# Patient Record
Sex: FEMALE | Race: WHITE | NOT HISPANIC OR LATINO | Employment: OTHER | URBAN - METROPOLITAN AREA
[De-identification: names, ages, dates, MRNs, and addresses within clinical notes are randomized per-mention and may not be internally consistent; named-entity substitution may affect disease eponyms.]

---

## 2023-04-10 LAB
ANION GAP IN SER/PLAS: 11 MMOL/L (ref 10–20)
BASOPHILS (10*3/UL) IN BLOOD BY AUTOMATED COUNT: 0.07 X10E9/L (ref 0–0.1)
BASOPHILS/100 LEUKOCYTES IN BLOOD BY AUTOMATED COUNT: 1.2 % (ref 0–2)
CALCIUM (MG/DL) IN SER/PLAS: 9.8 MG/DL (ref 8.6–10.3)
CARBON DIOXIDE, TOTAL (MMOL/L) IN SER/PLAS: 31 MMOL/L (ref 21–32)
CHLORIDE (MMOL/L) IN SER/PLAS: 101 MMOL/L (ref 98–107)
CREATININE (MG/DL) IN SER/PLAS: 0.56 MG/DL (ref 0.5–1.05)
EOSINOPHILS (10*3/UL) IN BLOOD BY AUTOMATED COUNT: 0.06 X10E9/L (ref 0–0.4)
EOSINOPHILS/100 LEUKOCYTES IN BLOOD BY AUTOMATED COUNT: 1 % (ref 0–6)
ERYTHROCYTE DISTRIBUTION WIDTH (RATIO) BY AUTOMATED COUNT: 13.1 % (ref 11.5–14.5)
ERYTHROCYTE MEAN CORPUSCULAR HEMOGLOBIN CONCENTRATION (G/DL) BY AUTOMATED: 32.4 G/DL (ref 32–36)
ERYTHROCYTE MEAN CORPUSCULAR VOLUME (FL) BY AUTOMATED COUNT: 94 FL (ref 80–100)
ERYTHROCYTES (10*6/UL) IN BLOOD BY AUTOMATED COUNT: 4.72 X10E12/L (ref 4–5.2)
GFR FEMALE: >90 ML/MIN/1.73M2
GLUCOSE (MG/DL) IN SER/PLAS: 101 MG/DL (ref 74–99)
HEMATOCRIT (%) IN BLOOD BY AUTOMATED COUNT: 44.4 % (ref 36–46)
HEMOGLOBIN (G/DL) IN BLOOD: 14.4 G/DL (ref 12–16)
IMMATURE GRANULOCYTES/100 LEUKOCYTES IN BLOOD BY AUTOMATED COUNT: 0.2 % (ref 0–0.9)
LEUKOCYTES (10*3/UL) IN BLOOD BY AUTOMATED COUNT: 6.1 X10E9/L (ref 4.4–11.3)
LYMPHOCYTES (10*3/UL) IN BLOOD BY AUTOMATED COUNT: 1.63 X10E9/L (ref 0.8–3)
LYMPHOCYTES/100 LEUKOCYTES IN BLOOD BY AUTOMATED COUNT: 26.9 % (ref 13–44)
MONOCYTES (10*3/UL) IN BLOOD BY AUTOMATED COUNT: 0.49 X10E9/L (ref 0.05–0.8)
MONOCYTES/100 LEUKOCYTES IN BLOOD BY AUTOMATED COUNT: 8.1 % (ref 2–10)
NEUTROPHILS (10*3/UL) IN BLOOD BY AUTOMATED COUNT: 3.79 X10E9/L (ref 1.6–5.5)
NEUTROPHILS/100 LEUKOCYTES IN BLOOD BY AUTOMATED COUNT: 62.6 % (ref 40–80)
PLATELETS (10*3/UL) IN BLOOD AUTOMATED COUNT: 233 X10E9/L (ref 150–450)
POTASSIUM (MMOL/L) IN SER/PLAS: 4.6 MMOL/L (ref 3.5–5.3)
SODIUM (MMOL/L) IN SER/PLAS: 138 MMOL/L (ref 136–145)
UREA NITROGEN (MG/DL) IN SER/PLAS: 10 MG/DL (ref 6–23)

## 2023-04-20 ENCOUNTER — HOSPITAL ENCOUNTER (OUTPATIENT)
Dept: DATA CONVERSION | Facility: HOSPITAL | Age: 83
End: 2023-04-20
Attending: ORTHOPAEDIC SURGERY | Admitting: ORTHOPAEDIC SURGERY
Payer: MEDICARE

## 2023-04-20 DIAGNOSIS — T84.82XS FIBROSIS DUE TO INTERNAL ORTHOPEDIC PROSTHETIC DEVICES, IMPLANTS AND GRAFTS, SEQUELA: ICD-10-CM

## 2023-04-20 DIAGNOSIS — T84.82XA: ICD-10-CM

## 2023-04-20 DIAGNOSIS — Z88.0 ALLERGY STATUS TO PENICILLIN: ICD-10-CM

## 2023-04-20 DIAGNOSIS — Z79.82 LONG TERM (CURRENT) USE OF ASPIRIN: ICD-10-CM

## 2023-09-07 VITALS — BODY MASS INDEX: 27.64 KG/M2 | HEIGHT: 66 IN | WEIGHT: 171.96 LBS

## 2023-09-14 NOTE — H&P
History & Physical Reviewed:   I have reviewed the History and Physical dated:  10-Apr-2023   History and Physical reviewed and relevant findings noted. Patient examined to review pertinent physical  findings.: No significant changes   Home Medications Reviewed: no changes noted   Allergies Reviewed: no changes noted       ERAS (Enhanced Recovery After Surgery):  ·  ERAS Patient: no     Consent:   COVID-19 Consent:  ·  COVID-19 Risk Consent Surgeon has reviewed key risks related to the risk of georgette COVID-19 and if they contract COVID-19 what the risks are.       Electronic Signatures:  Sixto Boswell)  (Signed 19-Apr-2023 10:55)   Authored: History & Physical Reviewed, ERAS, Consent,  Note Completion      Last Updated: 19-Apr-2023 10:55 by Sixto Boswell)

## 2023-10-02 NOTE — OP NOTE
Post Operative Note:     Post-Procedure Diagnosis: Arthrofibrosis Left total  knee   Procedure: Left knee arthrotomy and debridement and  manipulation   Surgeon: Elbert   Resident/Fellow/Other Assistant: Radha   Anesthesia: ga and regional   Estimated Blood Loss (mL): none   Specimen: no   Findings: see dx     Operative Report Dictated:  Dictation: not applicable - note contains Operative  Report   Operative Report:    Preop DX: Arthrofibrosis Left total knee  Postop DX: Same  Procedure: L knee arthrotomy with debridement and manipulation  Surgeon: Sixto Boswell MD  Asst: Agus Garcia  Anesthesia: ga and regional  Clinical Note: 82-year-old female status post primary left total knee at an outside hospital.  Present in the office last fall with persistent stiffness and inability to flex the knee past 90.  Has been to physical therapy.  She presents today for arthroscopic  possible open arthrotomy for debridement of arthrofibrosis.  Implants appeared stable by x-ray.  She understood the risk of surgery bleeding, infection, possibly further surgery or bracing.  Answered these risks wished proceed.  Procedure Note: Patient brought to operating room.  Timeout performed.  Antibiotics given IV.  General anesthesia given.  First attempted knee arthroscopy.  Leg was exsanguinated and thigh tourniquet was inflated to 325 mmHg.  No leg douglas was used.   Medial and lateral portals were made.  Palpation felt thickened scar in the anterior prepatellar region.  Shaver was placed in the joint debride the scar until we visualized the femoral component as well as the tibial poly.  After extensive shaving there  was still adherence and poor mobility of the patella with elected to proceed with open arthrotomy.  Previous incision was used.  Incision made down through skin down the extensor retinaculum.  There is adherence of this skin down to the extensor mechanism.   This was freed up with blunt dissection.  Medial  parapatellar arthritis performed with very thickened scar.  Some Ethibond sutures were removed.  Combination of sharp and blunt dissection expose the joint.  There was adherence in the quad on the anterior  distal femur.  This was broken up using a Mathew elevator.  Scar tissue was also sharply excised off the anterior cortex.  Scar was excised just posterior to the patellar tendon which is in continuity.  Some bands of scar towards the lateral gutter were  also excised.  Once all the scar tissue was excised, the knee was flexed to about 125 degrees with a soft endpoint.  Good central tracking of the patella.  Knee was irrigated copiously.  Arthrotomy repaired with interrupted 1 Vicryl suture.  Wound was  closed in layers.  Soft bulky dressing applied.  Regional block was then given by the anesthesia staff in the room prior to taking to the PACU.        Electronic Signatures:  Sixto Boswell)  (Signed 20-Apr-2023 11:23)   Authored: Post Operative Note, Note Completion      Last Updated: 20-Apr-2023 11:23 by Sixto Boswell)

## 2023-10-03 ENCOUNTER — OFFICE VISIT (OUTPATIENT)
Dept: PRIMARY CARE | Facility: CLINIC | Age: 83
End: 2023-10-03
Payer: MEDICARE

## 2023-10-03 ENCOUNTER — LAB (OUTPATIENT)
Dept: LAB | Facility: LAB | Age: 83
End: 2023-10-03
Payer: MEDICARE

## 2023-10-03 VITALS
HEART RATE: 77 BPM | HEIGHT: 66 IN | OXYGEN SATURATION: 97 % | DIASTOLIC BLOOD PRESSURE: 73 MMHG | WEIGHT: 163 LBS | TEMPERATURE: 97 F | BODY MASS INDEX: 26.2 KG/M2 | SYSTOLIC BLOOD PRESSURE: 152 MMHG

## 2023-10-03 DIAGNOSIS — F41.1 GAD (GENERALIZED ANXIETY DISORDER): ICD-10-CM

## 2023-10-03 DIAGNOSIS — K21.9 GERD WITHOUT ESOPHAGITIS: ICD-10-CM

## 2023-10-03 DIAGNOSIS — E78.2 HYPERLIPEMIA, MIXED: ICD-10-CM

## 2023-10-03 DIAGNOSIS — Z12.11 SCREENING FOR MALIGNANT NEOPLASM OF COLON: ICD-10-CM

## 2023-10-03 DIAGNOSIS — Z13.820 SCREENING FOR OSTEOPOROSIS: ICD-10-CM

## 2023-10-03 DIAGNOSIS — Z12.31 ENCOUNTER FOR SCREENING MAMMOGRAM FOR MALIGNANT NEOPLASM OF BREAST: ICD-10-CM

## 2023-10-03 DIAGNOSIS — R73.9 HYPERGLYCEMIA: ICD-10-CM

## 2023-10-03 DIAGNOSIS — E03.9 ACQUIRED HYPOTHYROIDISM: ICD-10-CM

## 2023-10-03 DIAGNOSIS — I10 BENIGN ESSENTIAL HYPERTENSION: ICD-10-CM

## 2023-10-03 DIAGNOSIS — L29.9 PRURITUS: ICD-10-CM

## 2023-10-03 DIAGNOSIS — J44.9 COPD MIXED TYPE (MULTI): Primary | ICD-10-CM

## 2023-10-03 DIAGNOSIS — J44.9 COPD MIXED TYPE (MULTI): ICD-10-CM

## 2023-10-03 PROBLEM — Z00.00 MEDICARE ANNUAL WELLNESS VISIT, SUBSEQUENT: Status: ACTIVE | Noted: 2023-10-03

## 2023-10-03 LAB
ALBUMIN SERPL BCP-MCNC: 4.5 G/DL (ref 3.4–5)
ALP SERPL-CCNC: 72 U/L (ref 33–136)
ALT SERPL W P-5'-P-CCNC: 19 U/L (ref 7–45)
ANION GAP SERPL CALC-SCNC: 15 MMOL/L (ref 10–20)
AST SERPL W P-5'-P-CCNC: 19 U/L (ref 9–39)
BASOPHILS # BLD AUTO: 0.05 X10*3/UL (ref 0–0.1)
BASOPHILS NFR BLD AUTO: 0.8 %
BILIRUB SERPL-MCNC: 0.5 MG/DL (ref 0–1.2)
BUN SERPL-MCNC: 11 MG/DL (ref 6–23)
CALCIUM SERPL-MCNC: 9.9 MG/DL (ref 8.6–10.6)
CHLORIDE SERPL-SCNC: 102 MMOL/L (ref 98–107)
CHOLEST SERPL-MCNC: 152 MG/DL (ref 0–199)
CHOLESTEROL/HDL RATIO: 2.3
CO2 SERPL-SCNC: 29 MMOL/L (ref 21–32)
CREAT SERPL-MCNC: 0.5 MG/DL (ref 0.5–1.05)
EOSINOPHIL # BLD AUTO: 0.05 X10*3/UL (ref 0–0.4)
EOSINOPHIL NFR BLD AUTO: 0.8 %
ERYTHROCYTE [DISTWIDTH] IN BLOOD BY AUTOMATED COUNT: 15 % (ref 11.5–14.5)
GFR SERPL CREATININE-BSD FRML MDRD: >90 ML/MIN/1.73M*2
GLUCOSE SERPL-MCNC: 127 MG/DL (ref 74–99)
HCT VFR BLD AUTO: 44.1 % (ref 36–46)
HDLC SERPL-MCNC: 66.9 MG/DL
HGB BLD-MCNC: 13.6 G/DL (ref 12–16)
IMM GRANULOCYTES # BLD AUTO: 0.01 X10*3/UL (ref 0–0.5)
IMM GRANULOCYTES NFR BLD AUTO: 0.2 % (ref 0–0.9)
LDLC SERPL CALC-MCNC: 66 MG/DL (ref 140–190)
LYMPHOCYTES # BLD AUTO: 1.41 X10*3/UL (ref 0.8–3)
LYMPHOCYTES NFR BLD AUTO: 21.8 %
MAGNESIUM SERPL-MCNC: 2.15 MG/DL (ref 1.6–2.4)
MCH RBC QN AUTO: 30.8 PG (ref 26–34)
MCHC RBC AUTO-ENTMCNC: 30.8 G/DL (ref 32–36)
MCV RBC AUTO: 100 FL (ref 80–100)
MONOCYTES # BLD AUTO: 0.42 X10*3/UL (ref 0.05–0.8)
MONOCYTES NFR BLD AUTO: 6.5 %
NEUTROPHILS # BLD AUTO: 4.52 X10*3/UL (ref 1.6–5.5)
NEUTROPHILS NFR BLD AUTO: 69.9 %
NON HDL CHOLESTEROL: 85 MG/DL (ref 0–149)
NRBC BLD-RTO: 0 /100 WBCS (ref 0–0)
PLATELET # BLD AUTO: 273 X10*3/UL (ref 150–450)
PMV BLD AUTO: 9.7 FL (ref 7.5–11.5)
POTASSIUM SERPL-SCNC: 4.2 MMOL/L (ref 3.5–5.3)
PROT SERPL-MCNC: 6.7 G/DL (ref 6.4–8.2)
RBC # BLD AUTO: 4.41 X10*6/UL (ref 4–5.2)
SODIUM SERPL-SCNC: 142 MMOL/L (ref 136–145)
TRIGL SERPL-MCNC: 94 MG/DL (ref 0–149)
TSH SERPL-ACNC: 1.55 MIU/L (ref 0.44–3.98)
URATE SERPL-MCNC: 2.5 MG/DL (ref 2.3–6.7)
VLDL: 19 MG/DL (ref 0–40)
WBC # BLD AUTO: 6.5 X10*3/UL (ref 4.4–11.3)

## 2023-10-03 PROCEDURE — 3077F SYST BP >= 140 MM HG: CPT | Performed by: INTERNAL MEDICINE

## 2023-10-03 PROCEDURE — 96372 THER/PROPH/DIAG INJ SC/IM: CPT | Performed by: INTERNAL MEDICINE

## 2023-10-03 PROCEDURE — 1159F MED LIST DOCD IN RCRD: CPT | Performed by: INTERNAL MEDICINE

## 2023-10-03 PROCEDURE — 99203 OFFICE O/P NEW LOW 30 MIN: CPT | Performed by: INTERNAL MEDICINE

## 2023-10-03 PROCEDURE — 1036F TOBACCO NON-USER: CPT | Performed by: INTERNAL MEDICINE

## 2023-10-03 PROCEDURE — 3078F DIAST BP <80 MM HG: CPT | Performed by: INTERNAL MEDICINE

## 2023-10-03 PROCEDURE — 36415 COLL VENOUS BLD VENIPUNCTURE: CPT

## 2023-10-03 PROCEDURE — 1125F AMNT PAIN NOTED PAIN PRSNT: CPT | Performed by: INTERNAL MEDICINE

## 2023-10-03 PROCEDURE — 1160F RVW MEDS BY RX/DR IN RCRD: CPT | Performed by: INTERNAL MEDICINE

## 2023-10-03 RX ORDER — TRIAMCINOLONE ACETONIDE 40 MG/ML
40 INJECTION, SUSPENSION INTRA-ARTICULAR; INTRAMUSCULAR ONCE
Status: COMPLETED | OUTPATIENT
Start: 2023-10-03 | End: 2023-10-03

## 2023-10-03 RX ORDER — AMITRIPTYLINE HYDROCHLORIDE 10 MG/1
10 TABLET, FILM COATED ORAL NIGHTLY
Qty: 30 TABLET | Refills: 0 | Status: SHIPPED | OUTPATIENT
Start: 2023-10-03 | End: 2023-12-05 | Stop reason: ALTCHOICE

## 2023-10-03 RX ORDER — VALACYCLOVIR HYDROCHLORIDE 500 MG/1
500 TABLET, FILM COATED ORAL DAILY
COMMUNITY

## 2023-10-03 RX ORDER — LOSARTAN POTASSIUM 50 MG/1
50 TABLET ORAL 2 TIMES DAILY
COMMUNITY
Start: 2023-07-12 | End: 2024-01-18 | Stop reason: SDUPTHER

## 2023-10-03 RX ORDER — FAMOTIDINE 40 MG/1
40 TABLET, FILM COATED ORAL 2 TIMES DAILY
COMMUNITY
End: 2023-12-05 | Stop reason: ALTCHOICE

## 2023-10-03 RX ORDER — MULTIVITAMIN
1 TABLET ORAL DAILY
COMMUNITY

## 2023-10-03 RX ORDER — HYDROXYZINE HYDROCHLORIDE 25 MG/1
25 TABLET, FILM COATED ORAL DAILY
Qty: 30 TABLET | Refills: 0 | Status: SHIPPED | OUTPATIENT
Start: 2023-10-03 | End: 2023-12-05 | Stop reason: ALTCHOICE

## 2023-10-03 RX ORDER — ATORVASTATIN CALCIUM 10 MG/1
10 TABLET, FILM COATED ORAL DAILY
COMMUNITY
End: 2024-02-27 | Stop reason: SDUPTHER

## 2023-10-03 RX ORDER — BUDESONIDE AND FORMOTEROL FUMARATE DIHYDRATE 80; 4.5 UG/1; UG/1
AEROSOL RESPIRATORY (INHALATION)
COMMUNITY
Start: 2023-09-29 | End: 2023-12-05 | Stop reason: SDUPTHER

## 2023-10-03 RX ADMIN — TRIAMCINOLONE ACETONIDE 40 MG: 40 INJECTION, SUSPENSION INTRA-ARTICULAR; INTRAMUSCULAR at 16:49

## 2023-10-03 NOTE — PROGRESS NOTES
Subjective   Patient ID: Rita Vazquez is a 83 y.o. female who presents for New Patient Visit (Itching and tingling all over on and off /Needs a follow up chest xray for pneumonia/Under a lot of stress/).    Assessment/Plan     Problem List Items Addressed This Visit       Medicare annual wellness visit, subsequent - Primary    Acquired hypothyroidism    Relevant Orders    Albumin , Urine Random    CBC and Auto Differential    Comprehensive Metabolic Panel    Hemoglobin A1C    Lipid Panel    Magnesium    Urinalysis Microscopic Only    Uric Acid    TSH with reflex to Free T4 if abnormal    GERD without esophagitis    Relevant Orders    Albumin , Urine Random    CBC and Auto Differential    Comprehensive Metabolic Panel    Hemoglobin A1C    Lipid Panel    Magnesium    Urinalysis Microscopic Only    Uric Acid    TSH with reflex to Free T4 if abnormal    Hyperlipemia, mixed    Relevant Orders    Albumin , Urine Random    CBC and Auto Differential    Comprehensive Metabolic Panel    Hemoglobin A1C    Lipid Panel    Magnesium    Urinalysis Microscopic Only    Uric Acid    TSH with reflex to Free T4 if abnormal    COPD mixed type (CMS/HCC)     Pt has moderate to severe COPD. It is progressive disease, use of MDI and proper technique discussed, rinse mouth after inhaled corticosteroids. Notify if progressive dyspnea or cough or lethargy, monitor oxygen saturation if possible with home based pulse oximetry. Avoid hospitalization and call us if any flare ups are about to happen, be sure that annual flu vaccine are uptodate and review need for pneumococcal vaccine. Light to moderate low impact exercises are very helpful and deep breathing  exercises are beneficial in chronic lung diseases.          Relevant Orders    Albumin , Urine Random    CBC and Auto Differential    Comprehensive Metabolic Panel    Hemoglobin A1C    Lipid Panel    Magnesium    Urinalysis Microscopic Only    Uric Acid    TSH with reflex to Free T4 if  abnormal    Hyperglycemia    Relevant Orders    Hemoglobin A1C    Benign essential hypertension     Patients BP readings reviewed and addressed, as we age our arteries turn stiffer and less elastic. Restricting salt consumption and staying physically fit with regular exercise regimen is the only way to keep our vasculature less tonic. Studies have shown that keeping ideal body wt, exercise routine about 140 to 150 minutes a week, eating variety of plant based diet and drinking plentiful water are quite helpful. Monitor BP twice or once a week at home and bring log to be reviewed by me. Uncontrolled BP has long term consequences including heart failure, myocardial infarction, accelerated atherosclerosis and kidney dysfunction. Therapy reviewed and explained.              HPI 83-year-old patient who is a  have a 1 child    1 brother 1 sister    Mother have Parkinson    Father had prostate cancer    Personal history of hypertension hyperlipidemia pneumonia COPD pruritus seen by PCP pulmonary dermatology    Complaining of the tingling numbness in the hands arms and legs onset gradually duration few months progressed slowly    Also complained of moderate pruritus insomnia    Cough congestion shortness of breath    Negative for hematuria Apetex's hemoptysis    Negative for weight loss    Negative for COVID    Negative for fall    Negative for suicide      Past Medical History:   Diagnosis Date    Personal history of other diseases of the musculoskeletal system and connective tissue 09/28/2022    History of arthritis    Personal history of other diseases of the respiratory system 09/28/2022    History of sinus problem    Personal history of other infectious and parasitic diseases     History of herpes genitalis    Urinary tract infection, site not specified 11/21/2021    Acute UTI     Past Surgical History:   Procedure Laterality Date    KNEE      OTHER SURGICAL HISTORY  09/28/2022    Knee replacement    OTHER  "SURGICAL HISTORY  09/28/2022    Back surgery     Allergies   Allergen Reactions    Amoxicillin Other    Metronidazole Other     Current Outpatient Medications   Medication Sig Dispense Refill    atorvastatin (Lipitor) 10 mg tablet Take 1 tablet (10 mg) by mouth once daily.      famotidine (Pepcid) 40 mg tablet Take 1 tablet (40 mg) by mouth 2 times a day.      losartan (Cozaar) 50 mg tablet Take 1 tablet (50 mg) by mouth 2 times a day.      Symbicort 80-4.5 mcg/actuation inhaler        No current facility-administered medications for this visit.     Family History   Problem Relation Name Age of Onset    Parkinsonism Mother      Prostate cancer Father       Social History     Socioeconomic History    Marital status:      Spouse name: None    Number of children: None    Years of education: None    Highest education level: None   Occupational History    None   Tobacco Use    Smoking status: Former     Packs/day: 0.50     Years: 30.00     Additional pack years: 0.00     Total pack years: 15.00     Types: Cigarettes    Smokeless tobacco: Never   Substance and Sexual Activity    Alcohol use: Never    Drug use: Never    Sexual activity: None   Other Topics Concern    None   Social History Narrative    None     Social Determinants of Health     Financial Resource Strain: Not on file   Food Insecurity: Not on file   Transportation Needs: Not on file   Physical Activity: Not on file   Stress: Not on file   Social Connections: Not on file   Intimate Partner Violence: Not on file   Housing Stability: Not on file       There is no immunization history on file for this patient.    Review of Systems  Review of systems is otherwise negative unless stated above or in history of present illness.    Objective   Visit Vitals  /73 (BP Location: Left arm, Patient Position: Sitting, BP Cuff Size: Adult)   Pulse 77   Temp 36.1 °C (97 °F)   Ht 1.676 m (5' 6\")   Wt 73.9 kg (163 lb)   SpO2 97%   BMI 26.31 kg/m²   Smoking Status " Former   BSA 1.85 m²     Physical Exam  Constitutional: Anxiety     General: not in acute distress.   HENT:      Head: Normocephalic and atraumatic.      Nose: Nose normal.   Eyes: Dry eye     Extraocular Movements: Extraocular movements intact.      Conjunctiva/sclera: Conjunctivae normal.   Cardiovascular: Heart murmur     Rate and Rhythm: Normal rate ,  No M/R/G  Pulmonary: Barrel chest crackles rales rhonchi     Effort: Pulmonary effort is normal.      Breath sounds: Normal, Bilat Equal AE  Skin: Pruritus dry skin     General: Skin is warm.   Neurological: Myalgia neuralgia     Mental Status: He is alert and oriented to person, place, and time.   Psychiatric:    Anxiety depression     Mood and Affect: Mood normal.         Behavior: Behavior normal.   Musculoskeletal osteopenia osteoarthritis  FROM in all extremitirs,  Joint-no swelling or tenderness    No visits with results within 4 Month(s) from this visit.   Latest known visit with results is:   Orders Only on 04/10/2023   Component Date Value Ref Range Status    WBC 04/10/2023 6.1  4.4 - 11.3 x10E9/L Final    RBC 04/10/2023 4.72  4.00 - 5.20 x10E12/L Final    Hemoglobin 04/10/2023 14.4  12.0 - 16.0 g/dL Final    Hematocrit 04/10/2023 44.4  36.0 - 46.0 % Final    MCV 04/10/2023 94  80 - 100 fL Final    MCHC 04/10/2023 32.4  32.0 - 36.0 g/dL Final    Platelets 04/10/2023 233  150 - 450 x10E9/L Final    RDW 04/10/2023 13.1  11.5 - 14.5 % Final    Neutrophils % 04/10/2023 62.6  40.0 - 80.0 % Final    Immature Granulocytes %, Automated 04/10/2023 0.2  0.0 - 0.9 % Final    Lymphocytes % 04/10/2023 26.9  13.0 - 44.0 % Final    Monocytes % 04/10/2023 8.1  2.0 - 10.0 % Final    Eosinophils % 04/10/2023 1.0  0.0 - 6.0 % Final    Basophils % 04/10/2023 1.2  0.0 - 2.0 % Final    Neutrophils Absolute 04/10/2023 3.79  1.60 - 5.50 x10E9/L Final    Lymphocytes Absolute 04/10/2023 1.63  0.80 - 3.00 x10E9/L Final    Monocytes Absolute 04/10/2023 0.49  0.05 - 0.80 x10E9/L  Final    Eosinophils Absolute 04/10/2023 0.06  0.00 - 0.40 x10E9/L Final    Basophils Absolute 04/10/2023 0.07  0.00 - 0.10 x10E9/L Final       Radiology: Reviewed imaging in powerchart.  No results found.      Charting was completed using voice recognition technology and may include unintended errors.

## 2023-10-04 LAB
EST. AVERAGE GLUCOSE BLD GHB EST-MCNC: 126 MG/DL
HBA1C MFR BLD: 6 %

## 2023-10-05 ENCOUNTER — TELEPHONE (OUTPATIENT)
Dept: PRIMARY CARE | Facility: CLINIC | Age: 83
End: 2023-10-05

## 2023-10-05 NOTE — TELEPHONE ENCOUNTER
"Tried calling pt but no answer. LVM for pt to return call, will try to call again later.     Per Dr. Kellogg \" Mild anemia hemoglobin A1c 6 blood sugar 127  Slow Fe 1 a day low-carb diet metformin 250 mg a day #90 follow-up 3 months repeat CBC BMP 3 months \"  "

## 2023-10-18 ENCOUNTER — TELEPHONE (OUTPATIENT)
Dept: PRIMARY CARE | Facility: CLINIC | Age: 83
End: 2023-10-18

## 2023-10-19 ENCOUNTER — TELEPHONE (OUTPATIENT)
Dept: PRIMARY CARE | Facility: CLINIC | Age: 83
End: 2023-10-19

## 2023-11-24 LAB — NONINV COLON CA DNA+OCC BLD SCRN STL QL: POSITIVE

## 2023-11-27 ENCOUNTER — TELEPHONE (OUTPATIENT)
Dept: PRIMARY CARE | Facility: CLINIC | Age: 83
End: 2023-11-27

## 2023-11-27 NOTE — TELEPHONE ENCOUNTER
----- Message from Js Kellogg MD sent at 11/27/2023  8:45 AM EST -----  Positive Cologuard advised to see Dr. Steinberg for colonoscopy or Dr. Villagran

## 2023-12-05 ENCOUNTER — OFFICE VISIT (OUTPATIENT)
Dept: PRIMARY CARE | Facility: CLINIC | Age: 83
End: 2023-12-05
Payer: MEDICARE

## 2023-12-05 VITALS
DIASTOLIC BLOOD PRESSURE: 68 MMHG | HEIGHT: 66 IN | SYSTOLIC BLOOD PRESSURE: 131 MMHG | TEMPERATURE: 96.3 F | HEART RATE: 65 BPM | OXYGEN SATURATION: 96 % | BODY MASS INDEX: 25.75 KG/M2 | WEIGHT: 160.2 LBS

## 2023-12-05 DIAGNOSIS — E78.2 HYPERLIPEMIA, MIXED: ICD-10-CM

## 2023-12-05 DIAGNOSIS — J44.9 COPD MIXED TYPE (MULTI): Primary | ICD-10-CM

## 2023-12-05 DIAGNOSIS — L29.9 PRURITUS: ICD-10-CM

## 2023-12-05 DIAGNOSIS — I10 BENIGN ESSENTIAL HYPERTENSION: ICD-10-CM

## 2023-12-05 PROBLEM — R73.9 HYPERGLYCEMIA: Status: RESOLVED | Noted: 2023-10-03 | Resolved: 2023-12-05

## 2023-12-05 PROBLEM — Z00.00 MEDICARE ANNUAL WELLNESS VISIT, SUBSEQUENT: Status: RESOLVED | Noted: 2023-10-03 | Resolved: 2023-12-05

## 2023-12-05 PROBLEM — K21.9 GERD WITHOUT ESOPHAGITIS: Status: RESOLVED | Noted: 2023-10-03 | Resolved: 2023-12-05

## 2023-12-05 PROBLEM — F41.1 GAD (GENERALIZED ANXIETY DISORDER): Status: RESOLVED | Noted: 2023-10-03 | Resolved: 2023-12-05

## 2023-12-05 PROBLEM — E03.9 ACQUIRED HYPOTHYROIDISM: Status: RESOLVED | Noted: 2023-10-03 | Resolved: 2023-12-05

## 2023-12-05 PROCEDURE — 96372 THER/PROPH/DIAG INJ SC/IM: CPT | Performed by: INTERNAL MEDICINE

## 2023-12-05 PROCEDURE — 99214 OFFICE O/P EST MOD 30 MIN: CPT | Performed by: INTERNAL MEDICINE

## 2023-12-05 PROCEDURE — 3075F SYST BP GE 130 - 139MM HG: CPT | Performed by: INTERNAL MEDICINE

## 2023-12-05 PROCEDURE — 1125F AMNT PAIN NOTED PAIN PRSNT: CPT | Performed by: INTERNAL MEDICINE

## 2023-12-05 PROCEDURE — 1160F RVW MEDS BY RX/DR IN RCRD: CPT | Performed by: INTERNAL MEDICINE

## 2023-12-05 PROCEDURE — 3078F DIAST BP <80 MM HG: CPT | Performed by: INTERNAL MEDICINE

## 2023-12-05 PROCEDURE — 1036F TOBACCO NON-USER: CPT | Performed by: INTERNAL MEDICINE

## 2023-12-05 PROCEDURE — 1159F MED LIST DOCD IN RCRD: CPT | Performed by: INTERNAL MEDICINE

## 2023-12-05 RX ORDER — TRIAMCINOLONE ACETONIDE 40 MG/ML
80 INJECTION, SUSPENSION INTRA-ARTICULAR; INTRAMUSCULAR ONCE
Status: COMPLETED | OUTPATIENT
Start: 2023-12-05 | End: 2023-12-05

## 2023-12-05 RX ORDER — BUDESONIDE AND FORMOTEROL FUMARATE DIHYDRATE 80; 4.5 UG/1; UG/1
AEROSOL RESPIRATORY (INHALATION)
Qty: 10.2 EACH | Refills: 3 | Status: SHIPPED | OUTPATIENT
Start: 2023-12-05

## 2023-12-05 RX ORDER — HYDROXYZINE HYDROCHLORIDE 25 MG/1
25 TABLET, FILM COATED ORAL DAILY
Qty: 90 TABLET | Refills: 1 | Status: SHIPPED | OUTPATIENT
Start: 2023-12-05 | End: 2024-02-23 | Stop reason: WASHOUT

## 2023-12-05 RX ADMIN — TRIAMCINOLONE ACETONIDE 80 MG: 40 INJECTION, SUSPENSION INTRA-ARTICULAR; INTRAMUSCULAR at 13:59

## 2023-12-05 NOTE — PROGRESS NOTES
Subjective   Patient ID: Rita Vazquez is a 83 y.o. female who presents for Follow-up (3 month /How long should she be on the iron/Question on the medication for itching ).    Assessment/Plan     Problem List Items Addressed This Visit       Hyperlipemia, mixed     Low-fat diet exercise Lipitor 10 mg a day LFT CPK lipid twice a year         COPD mixed type (CMS/HCC) - Primary     COPD pruritus itching allergy given Kenalog injection 80 mg intramuscular continue Symbicort and Atarax    Flu pneumonia COVID-19 vaccines    PFT once a year         Relevant Orders    CBC and Auto Differential    Basic metabolic panel    Benign essential hypertension     Blood pressure 131/68 losartan 50 mg a day check BMP twice a year         Relevant Orders    CBC and Auto Differential    Basic metabolic panel    Pruritus     Patient was evaluated today, problem list was reviewed, problems and concerns addressed, Rx list reviewed and updated, lab and tests were noted and reviewed. Life style changes were discussed, always it works better if we eat plant based diet and plenty of fibres and roughage. Consume adequate amount of water and avoid alcohol, light to moderate physical activities and stress reduction are always beneficial for ongoing physical well being. Do not forget to have 6 to 7 hours of sleep regularly and avoid late night julio cesar screen exposure.    HPI 83-year-old patient have metabolic syndrome hypertension hyperlipidemia chronic lung disease chronic pruritus osteopenia osteoarthritis iron deficiency anemia Cologuard positive going to see gastroenterology for colonoscopy    Complaining the cough congestions pruritus itching    Onset gradually duration few months progressed slowly aggravating factor cold weather autoimmune disease    Hyperlipidemia Lipitor    Anemia iron B12 folic acid    Hypertension Cozaar    COPD Symbicort Kenalog    Occult blood and Cologuard positive refer to GI for colonoscopy and further  Past Medical  History:   Diagnosis Date    Personal history of other diseases of the musculoskeletal system and connective tissue 09/28/2022    History of arthritis    Personal history of other diseases of the respiratory system 09/28/2022    History of sinus problem    Personal history of other infectious and parasitic diseases     History of herpes genitalis    Urinary tract infection, site not specified 11/21/2021    Acute UTI     Past Surgical History:   Procedure Laterality Date    KNEE      OTHER SURGICAL HISTORY  09/28/2022    Knee replacement    OTHER SURGICAL HISTORY  09/28/2022    Back surgery     Allergies   Allergen Reactions    Amoxicillin Other    Bisacodyl Unknown    Metronidazole Other     Current Outpatient Medications   Medication Sig Dispense Refill    atorvastatin (Lipitor) 10 mg tablet Take 1 tablet (10 mg) by mouth once daily.      calcium carbonate/vitamin D3 (CALTRATE WITH VITAMIN D3 ORAL) Take by mouth.      ferrous sulfate (SLOW FE ORAL) Take by mouth.      losartan (Cozaar) 50 mg tablet Take 1 tablet (50 mg) by mouth 2 times a day.      multivitamin tablet Take 1 tablet by mouth once daily.      Symbicort 80-4.5 mcg/actuation inhaler       valACYclovir (Valtrex) 500 mg tablet Take 1 tablet (500 mg) by mouth once daily.       No current facility-administered medications for this visit.     Family History   Problem Relation Name Age of Onset    Parkinsonism Mother      Prostate cancer Father       Social History     Socioeconomic History    Marital status:      Spouse name: None    Number of children: None    Years of education: None    Highest education level: None   Occupational History    None   Tobacco Use    Smoking status: Former     Packs/day: 0.50     Years: 30.00     Additional pack years: 0.00     Total pack years: 15.00     Types: Cigarettes    Smokeless tobacco: Never   Substance and Sexual Activity    Alcohol use: Never    Drug use: Never    Sexual activity: None   Other Topics  "Concern    None   Social History Narrative    None     Social Determinants of Health     Financial Resource Strain: Not on file   Food Insecurity: Not on file   Transportation Needs: Not on file   Physical Activity: Not on file   Stress: Not on file   Social Connections: Not on file   Intimate Partner Violence: Not on file   Housing Stability: Not on file     Immunization History   Administered Date(s) Administered    Flu vaccine, quadrivalent, high-dose, preservative free, age 65y+ (FLUZONE) 10/23/2023    Influenza, High Dose Seasonal, Preservative Free 10/25/2016, 10/11/2018    Influenza, Seasonal, Quadrivalent, Adjuvanted 10/01/2020, 10/06/2021, 10/07/2022    Influenza, injectable, MDCK, preservative free, quadrivalent 10/30/2017    Influenza, seasonal, injectable 11/09/2011    Influenza, seasonal, injectable, preservative free 10/12/2015    Moderna COVID-19 vaccine, Fall 2023, 12 yeasrs and older (50mcg/0.5mL) 10/09/2023    Moderna COVID-19 vaccine, bivalent, blue cap/gray label *Check age/dose* 11/21/2022    Moderna SARS-CoV-2 Vaccination 01/07/2021, 02/03/2021, 10/29/2021, 06/10/2022    Pneumococcal conjugate vaccine, 13-valent (PREVNAR 13) 10/12/2015    Pneumococcal conjugate vaccine, 20-valent (PREVNAR 20) 06/30/2023    Pneumococcal polysaccharide vaccine, 23-valent, age 2 years and older (PNEUMOVAX 23) 11/02/2020    Tdap vaccine, age 7 year and older (BOOSTRIX) 06/30/2023    Zoster vaccine, recombinant, adult (SHINGRIX) 09/24/2019, 09/27/2019, 12/03/2019    Zoster, live 01/10/2017       Review of Systems  Review of systems is otherwise negative unless stated above or in history of present illness.    Objective   Visit Vitals  /68 (BP Location: Left arm, Patient Position: Sitting, BP Cuff Size: Adult)   Pulse 65   Temp 35.7 °C (96.3 °F)   Ht 1.676 m (5' 6\")   Wt 72.7 kg (160 lb 3.2 oz)   SpO2 96%   BMI 25.86 kg/m²   Smoking Status Former   BSA 1.84 m²     Physical Exam  Constitutional: BMI 25     " General: not in acute distress.   HENT: Allergic right     Head: Normocephalic and atraumatic.      Nose: Nose normal.   Eyes:      Extraocular Movements: Extraocular movements intact.      Conjunctiva/sclera: Conjunctivae normal.   Cardiovascular: Systolic heart   rate and Rhythm: Normal rate ,  No M/R/G  Pulmonary: Expiratory rhonchi with crackles     Effort: Pulmonary effort is normal.      Breath sounds: Normal, Bilat Equal AE  Skin: Dry skin with pruritus     General: Skin is warm.   Neurological:      Mental Status: He is alert and oriented to person, place, and time.   Psychiatric:         Mood and Affect: Mood normal.         Behavior: Behavior normal.   Musculoskeletal   FROM in all extremitirs,  Joint-no swelling or tenderness  Glucose 127 low-carb diet hemoglobin A1c 6 dietitian evaluation Cologuard positive and GI evaluation  Lab on 10/03/2023   Component Date Value Ref Range Status    WBC 10/03/2023 6.5  4.4 - 11.3 x10*3/uL Final    nRBC 10/03/2023 0.0  0.0 - 0.0 /100 WBCs Final    RBC 10/03/2023 4.41  4.00 - 5.20 x10*6/uL Final    Hemoglobin 10/03/2023 13.6  12.0 - 16.0 g/dL Final    Hematocrit 10/03/2023 44.1  36.0 - 46.0 % Final    MCV 10/03/2023 100  80 - 100 fL Final    MCH 10/03/2023 30.8  26.0 - 34.0 pg Final    MCHC 10/03/2023 30.8 (L)  32.0 - 36.0 g/dL Final    RDW 10/03/2023 15.0 (H)  11.5 - 14.5 % Final    Platelets 10/03/2023 273  150 - 450 x10*3/uL Final    MPV 10/03/2023 9.7  7.5 - 11.5 fL Final    Neutrophils % 10/03/2023 69.9  40.0 - 80.0 % Final    Immature Granulocytes %, Automated 10/03/2023 0.2  0.0 - 0.9 % Final    Lymphocytes % 10/03/2023 21.8  13.0 - 44.0 % Final    Monocytes % 10/03/2023 6.5  2.0 - 10.0 % Final    Eosinophils % 10/03/2023 0.8  0.0 - 6.0 % Final    Basophils % 10/03/2023 0.8  0.0 - 2.0 % Final    Neutrophils Absolute 10/03/2023 4.52  1.60 - 5.50 x10*3/uL Final    Immature Granulocytes Absolute, Au* 10/03/2023 0.01  0.00 - 0.50 x10*3/uL Final    Lymphocytes  Absolute 10/03/2023 1.41  0.80 - 3.00 x10*3/uL Final    Monocytes Absolute 10/03/2023 0.42  0.05 - 0.80 x10*3/uL Final    Eosinophils Absolute 10/03/2023 0.05  0.00 - 0.40 x10*3/uL Final    Basophils Absolute 10/03/2023 0.05  0.00 - 0.10 x10*3/uL Final    Glucose 10/03/2023 127 (H)  74 - 99 mg/dL Final    Sodium 10/03/2023 142  136 - 145 mmol/L Final    Potassium 10/03/2023 4.2  3.5 - 5.3 mmol/L Final    Chloride 10/03/2023 102  98 - 107 mmol/L Final    Bicarbonate 10/03/2023 29  21 - 32 mmol/L Final    Anion Gap 10/03/2023 15  10 - 20 mmol/L Final    Urea Nitrogen 10/03/2023 11  6 - 23 mg/dL Final    Creatinine 10/03/2023 0.50  0.50 - 1.05 mg/dL Final    eGFR 10/03/2023 >90  >60 mL/min/1.73m*2 Final    Calcium 10/03/2023 9.9  8.6 - 10.6 mg/dL Final    Albumin 10/03/2023 4.5  3.4 - 5.0 g/dL Final    Alkaline Phosphatase 10/03/2023 72  33 - 136 U/L Final    Total Protein 10/03/2023 6.7  6.4 - 8.2 g/dL Final    AST 10/03/2023 19  9 - 39 U/L Final    Bilirubin, Total 10/03/2023 0.5  0.0 - 1.2 mg/dL Final    ALT 10/03/2023 19  7 - 45 U/L Final    Hemoglobin A1C 10/03/2023 6.0 (H)  see below % Final    Estimated Average Glucose 10/03/2023 126  Not Established mg/dL Final    Cholesterol 10/03/2023 152  0 - 199 mg/dL Final    HDL-Cholesterol 10/03/2023 66.9  mg/dL Final    Cholesterol/HDL Ratio 10/03/2023 2.3   Final    LDL Calculated 10/03/2023 66 (L)  140 - 190 mg/dL Final    VLDL 10/03/2023 19  0 - 40 mg/dL Final    Triglycerides 10/03/2023 94  0 - 149 mg/dL Final    Non HDL Cholesterol 10/03/2023 85  0 - 149 mg/dL Final    Magnesium 10/03/2023 2.15  1.60 - 2.40 mg/dL Final    Uric Acid 10/03/2023 2.5  2.3 - 6.7 mg/dL Final    Thyroid Stimulating Hormone 10/03/2023 1.55  0.44 - 3.98 mIU/L Final   Office Visit on 10/03/2023   Component Date Value Ref Range Status    NONINV COLON CA DNA+OCC BLD SCRN S* 11/15/2023 Positive (A)  Negative Final       Radiology: Reviewed imaging in powerchart.  No results found.      Charting  was completed using voice recognition technology and may include unintended errors.

## 2023-12-05 NOTE — ASSESSMENT & PLAN NOTE
COPD pruritus itching allergy given Kenalog injection 80 mg intramuscular continue Symbicort and Atarax    Flu pneumonia COVID-19 vaccines    PFT once a year

## 2023-12-27 ENCOUNTER — TELEMEDICINE (OUTPATIENT)
Dept: PRIMARY CARE | Facility: CLINIC | Age: 83
End: 2023-12-27
Payer: MEDICARE

## 2023-12-27 VITALS — HEIGHT: 66 IN | BODY MASS INDEX: 25.71 KG/M2 | WEIGHT: 160 LBS

## 2023-12-27 DIAGNOSIS — U07.1 COVID: Primary | ICD-10-CM

## 2023-12-27 PROCEDURE — 99213 OFFICE O/P EST LOW 20 MIN: CPT | Performed by: EMERGENCY MEDICINE

## 2023-12-27 RX ORDER — IPRATROPIUM BROMIDE AND ALBUTEROL SULFATE 2.5; .5 MG/3ML; MG/3ML
SOLUTION RESPIRATORY (INHALATION)
COMMUNITY

## 2023-12-27 NOTE — PROGRESS NOTES
Subjective   Patient ID: Rita Vazquez is a 83 y.o. female who presents for Covid-19 Home Monitoring Visit.    Assessment/Plan   Problem List Items Addressed This Visit    None  Visit Diagnoses       COVID    -  Primary    Relevant Medications    nirmatrelvir-ritonavir (PAXLOVID) 300 mg (150 mg x 2)-100 mg tablet therapy pack          Covid- paxlovid rx sent. Can continue OTC cough suppressant/decongestant use as needed to manage symptoms.     COPD- symbicort     Hypertension- Cozaar 50mg     Hyperlipidemia- continue lipitor    Follow up as needed     Source of history: Nurse, Medical personnel, Medical record, Patient.  History limitation: None.    HPI  83 y.o. female here for virtual visit     This visit was completed virtually due to the restrictions of the COVID-19 pandemic. All issues as below were discussed and addressed but no physical exam was performed. If it was felt that the patient should be evaluated in clinic or ER, then they were directed there. The patient verbally consented to visit      Covid positive, symptoms onset 4 days ago. Sore throat has improve since onset. Cough and congestion now worsening.     Allergies   Allergen Reactions    Amoxicillin Other    Bisacodyl Unknown    Metronidazole Other       Current Outpatient Medications   Medication Sig Dispense Refill    atorvastatin (Lipitor) 10 mg tablet Take 1 tablet (10 mg) by mouth once daily.      budesonide-formoteroL (Symbicort) 80-4.5 mcg/actuation inhaler Inhale 2 puffs twice a day 10.2 each 3    calcium carbonate/vitamin D3 (CALTRATE WITH VITAMIN D3 ORAL) Take by mouth.      ferrous sulfate (SLOW FE ORAL) Take by mouth.      hydrOXYzine HCL (Atarax) 25 mg tablet Take 1 tablet (25 mg) by mouth once daily. 90 tablet 1    ipratropium-albuteroL (Duo-Neb) 0.5-2.5 mg/3 mL nebulizer solution inhale the contents of 1 vial via nebulizer every 12 hours      losartan (Cozaar) 50 mg tablet Take 1 tablet (50 mg) by mouth 2 times a day.       "multivitamin tablet Take 1 tablet by mouth once daily.      valACYclovir (Valtrex) 500 mg tablet Take 1 tablet (500 mg) by mouth once daily.      nirmatrelvir-ritonavir (PAXLOVID) 300 mg (150 mg x 2)-100 mg tablet therapy pack Take 3 tablets by mouth 2 times a day for 5 days. Follow the instructions on the package 30 tablet 0     No current facility-administered medications for this visit.       Objective   Visit Vitals  Ht 1.676 m (5' 6\")   Wt 72.6 kg (160 lb)   BMI 25.82 kg/m²   Smoking Status Former   BSA 1.84 m²     Physical Exam  Patient was not physically examined     Review of Systems   Comprehensive review of systems as allowed by patient condition and nursing input is negative    Lab on 10/03/2023   Component Date Value Ref Range Status    WBC 10/03/2023 6.5  4.4 - 11.3 x10*3/uL Final    nRBC 10/03/2023 0.0  0.0 - 0.0 /100 WBCs Final    RBC 10/03/2023 4.41  4.00 - 5.20 x10*6/uL Final    Hemoglobin 10/03/2023 13.6  12.0 - 16.0 g/dL Final    Hematocrit 10/03/2023 44.1  36.0 - 46.0 % Final    MCV 10/03/2023 100  80 - 100 fL Final    MCH 10/03/2023 30.8  26.0 - 34.0 pg Final    MCHC 10/03/2023 30.8 (L)  32.0 - 36.0 g/dL Final    RDW 10/03/2023 15.0 (H)  11.5 - 14.5 % Final    Platelets 10/03/2023 273  150 - 450 x10*3/uL Final    MPV 10/03/2023 9.7  7.5 - 11.5 fL Final    Neutrophils % 10/03/2023 69.9  40.0 - 80.0 % Final    Immature Granulocytes %, Automated 10/03/2023 0.2  0.0 - 0.9 % Final    Lymphocytes % 10/03/2023 21.8  13.0 - 44.0 % Final    Monocytes % 10/03/2023 6.5  2.0 - 10.0 % Final    Eosinophils % 10/03/2023 0.8  0.0 - 6.0 % Final    Basophils % 10/03/2023 0.8  0.0 - 2.0 % Final    Neutrophils Absolute 10/03/2023 4.52  1.60 - 5.50 x10*3/uL Final    Immature Granulocytes Absolute, Au* 10/03/2023 0.01  0.00 - 0.50 x10*3/uL Final    Lymphocytes Absolute 10/03/2023 1.41  0.80 - 3.00 x10*3/uL Final    Monocytes Absolute 10/03/2023 0.42  0.05 - 0.80 x10*3/uL Final    Eosinophils Absolute 10/03/2023 0.05  " 0.00 - 0.40 x10*3/uL Final    Basophils Absolute 10/03/2023 0.05  0.00 - 0.10 x10*3/uL Final    Glucose 10/03/2023 127 (H)  74 - 99 mg/dL Final    Sodium 10/03/2023 142  136 - 145 mmol/L Final    Potassium 10/03/2023 4.2  3.5 - 5.3 mmol/L Final    Chloride 10/03/2023 102  98 - 107 mmol/L Final    Bicarbonate 10/03/2023 29  21 - 32 mmol/L Final    Anion Gap 10/03/2023 15  10 - 20 mmol/L Final    Urea Nitrogen 10/03/2023 11  6 - 23 mg/dL Final    Creatinine 10/03/2023 0.50  0.50 - 1.05 mg/dL Final    eGFR 10/03/2023 >90  >60 mL/min/1.73m*2 Final    Calcium 10/03/2023 9.9  8.6 - 10.6 mg/dL Final    Albumin 10/03/2023 4.5  3.4 - 5.0 g/dL Final    Alkaline Phosphatase 10/03/2023 72  33 - 136 U/L Final    Total Protein 10/03/2023 6.7  6.4 - 8.2 g/dL Final    AST 10/03/2023 19  9 - 39 U/L Final    Bilirubin, Total 10/03/2023 0.5  0.0 - 1.2 mg/dL Final    ALT 10/03/2023 19  7 - 45 U/L Final    Hemoglobin A1C 10/03/2023 6.0 (H)  see below % Final    Estimated Average Glucose 10/03/2023 126  Not Established mg/dL Final    Cholesterol 10/03/2023 152  0 - 199 mg/dL Final    HDL-Cholesterol 10/03/2023 66.9  mg/dL Final    Cholesterol/HDL Ratio 10/03/2023 2.3   Final    LDL Calculated 10/03/2023 66 (L)  140 - 190 mg/dL Final    VLDL 10/03/2023 19  0 - 40 mg/dL Final    Triglycerides 10/03/2023 94  0 - 149 mg/dL Final    Non HDL Cholesterol 10/03/2023 85  0 - 149 mg/dL Final    Magnesium 10/03/2023 2.15  1.60 - 2.40 mg/dL Final    Uric Acid 10/03/2023 2.5  2.3 - 6.7 mg/dL Final    Thyroid Stimulating Hormone 10/03/2023 1.55  0.44 - 3.98 mIU/L Final   Office Visit on 10/03/2023   Component Date Value Ref Range Status    NONINV COLON CA DNA+OCC BLD SCRN S* 11/15/2023 Positive (A)  Negative Final       Radiology: Reviewed imaging in powerchart.  No results found.    Family History   Problem Relation Name Age of Onset    Parkinsonism Mother      Prostate cancer Father       Social History     Socioeconomic History    Marital status:       Spouse name: None    Number of children: None    Years of education: None    Highest education level: None   Occupational History    None   Tobacco Use    Smoking status: Former     Packs/day: 0.50     Years: 30.00     Additional pack years: 0.00     Total pack years: 15.00     Types: Cigarettes    Smokeless tobacco: Never   Substance and Sexual Activity    Alcohol use: Never    Drug use: Never    Sexual activity: None   Other Topics Concern    None   Social History Narrative    None     Social Determinants of Health     Financial Resource Strain: Not on file   Food Insecurity: Not on file   Transportation Needs: Not on file   Physical Activity: Not on file   Stress: Not on file   Social Connections: Not on file   Intimate Partner Violence: Not on file   Housing Stability: Not on file     Past Medical History:   Diagnosis Date    Personal history of other diseases of the musculoskeletal system and connective tissue 09/28/2022    History of arthritis    Personal history of other diseases of the respiratory system 09/28/2022    History of sinus problem    Personal history of other infectious and parasitic diseases     History of herpes genitalis    Urinary tract infection, site not specified 11/21/2021    Acute UTI     Past Surgical History:   Procedure Laterality Date    KNEE      OTHER SURGICAL HISTORY  09/28/2022    Knee replacement    OTHER SURGICAL HISTORY  09/28/2022    Back surgery       Scribe Attestation  By signing my name below, ICuca Scribe   attest that this documentation has been prepared under the direction and in the presence of Magan Henley MD.

## 2023-12-28 ENCOUNTER — TELEPHONE (OUTPATIENT)
Dept: PRIMARY CARE | Facility: CLINIC | Age: 83
End: 2023-12-28

## 2023-12-28 DIAGNOSIS — J44.9 COPD MIXED TYPE (MULTI): ICD-10-CM

## 2023-12-28 NOTE — TELEPHONE ENCOUNTER
Febrile x 40 hours. Tmax 105 (temporal) Sunday evening. With Tylenol fever came own to 101 that noc. Waking q 90 minutes Sunday and Monday noc. Pt mom reports that pt has cough, c/o ST and watery eyes. Pt taking fluids well, urinating q 3-4 hours. No appetite yesterday, but did eat lunch today. Temp today is 99.0. Mostly laying around today. Call transferred to recept for appt to be made.         RX WITH PAXLOVID YESTERDAY. STOPPED. HAD 3 OF THE SIDE EFFECTS. ITCHING, METALLIC TASTE, STOMACH PAIN. SHE IS FEELING BETTER TODAY

## 2023-12-30 ENCOUNTER — OFFICE VISIT (OUTPATIENT)
Dept: URGENT CARE | Facility: CLINIC | Age: 83
End: 2023-12-30
Payer: MEDICARE

## 2023-12-30 VITALS
SYSTOLIC BLOOD PRESSURE: 155 MMHG | BODY MASS INDEX: 25.82 KG/M2 | HEART RATE: 66 BPM | WEIGHT: 160 LBS | TEMPERATURE: 98.1 F | DIASTOLIC BLOOD PRESSURE: 82 MMHG | OXYGEN SATURATION: 95 % | RESPIRATION RATE: 12 BRPM

## 2023-12-30 DIAGNOSIS — R30.0 BURNING WITH URINATION: Primary | ICD-10-CM

## 2023-12-30 DIAGNOSIS — N30.00 ACUTE CYSTITIS WITHOUT HEMATURIA: ICD-10-CM

## 2023-12-30 LAB
POC APPEARANCE, URINE: ABNORMAL
POC BILIRUBIN, URINE: NEGATIVE
POC BLOOD, URINE: ABNORMAL
POC COLOR, URINE: YELLOW
POC GLUCOSE, URINE: NEGATIVE MG/DL
POC KETONES, URINE: NEGATIVE MG/DL
POC LEUKOCYTES, URINE: ABNORMAL
POC NITRITE,URINE: NEGATIVE
POC PH, URINE: 6 PH
POC PROTEIN, URINE: ABNORMAL MG/DL
POC SPECIFIC GRAVITY, URINE: 1.02
POC UROBILINOGEN, URINE: 0.2 EU/DL

## 2023-12-30 PROCEDURE — 1125F AMNT PAIN NOTED PAIN PRSNT: CPT | Performed by: PHYSICIAN ASSISTANT

## 2023-12-30 PROCEDURE — 1160F RVW MEDS BY RX/DR IN RCRD: CPT | Performed by: PHYSICIAN ASSISTANT

## 2023-12-30 PROCEDURE — 81002 URINALYSIS NONAUTO W/O SCOPE: CPT | Performed by: PHYSICIAN ASSISTANT

## 2023-12-30 PROCEDURE — 99204 OFFICE O/P NEW MOD 45 MIN: CPT | Performed by: PHYSICIAN ASSISTANT

## 2023-12-30 PROCEDURE — 87186 SC STD MICRODIL/AGAR DIL: CPT

## 2023-12-30 PROCEDURE — 87086 URINE CULTURE/COLONY COUNT: CPT

## 2023-12-30 PROCEDURE — 3077F SYST BP >= 140 MM HG: CPT | Performed by: PHYSICIAN ASSISTANT

## 2023-12-30 PROCEDURE — 1159F MED LIST DOCD IN RCRD: CPT | Performed by: PHYSICIAN ASSISTANT

## 2023-12-30 PROCEDURE — 1036F TOBACCO NON-USER: CPT | Performed by: PHYSICIAN ASSISTANT

## 2023-12-30 PROCEDURE — 3079F DIAST BP 80-89 MM HG: CPT | Performed by: PHYSICIAN ASSISTANT

## 2023-12-30 RX ORDER — CIPROFLOXACIN 500 MG/1
500 TABLET ORAL 2 TIMES DAILY
Qty: 10 TABLET | Refills: 0 | Status: SHIPPED | OUTPATIENT
Start: 2023-12-30 | End: 2024-01-04

## 2023-12-30 ASSESSMENT — ENCOUNTER SYMPTOMS
NAUSEA: 0
NEUROLOGICAL NEGATIVE: 1
HEMATOLOGIC/LYMPHATIC NEGATIVE: 1
HEMATURIA: 0
ENDOCRINE NEGATIVE: 1
ALLERGIC/IMMUNOLOGIC NEGATIVE: 1
ABDOMINAL PAIN: 1
DYSURIA: 0
PSYCHIATRIC NEGATIVE: 1
CARDIOVASCULAR NEGATIVE: 1
BACK PAIN: 1
EYES NEGATIVE: 1
RESPIRATORY NEGATIVE: 1
FEVER: 0
FREQUENCY: 1
VOMITING: 0

## 2023-12-30 ASSESSMENT — PAIN SCALES - GENERAL: PAINLEVEL: 8

## 2023-12-30 NOTE — PATIENT INSTRUCTIONS
Increase clear fluids-water, cranberry juice  Pcp follow up this week if not improving or worsening  ER visit anytime 24/7 for acute worsening or changing condition

## 2023-12-30 NOTE — PROGRESS NOTES
Subjective   Patient ID: Rita Vazquez is a 83 y.o. female.      History provided by:  Patient   used: No    UTI  Associated symptoms: abdominal pain    Associated symptoms: no fever, no nausea and no vomiting      This us a 83 yr old female here for  sxs. Low back pain, urine frequency, and urinary pressure/pain X 1 day. No abnormal vaginal discharge, genital rash or lesions, hematuria, n/v or fever. Has COVID currently.     Review of Systems   Constitutional:  Negative for fever.   HENT: Negative.     Eyes: Negative.    Respiratory: Negative.     Cardiovascular: Negative.    Gastrointestinal:  Positive for abdominal pain. Negative for nausea and vomiting.   Endocrine: Negative.    Genitourinary:  Positive for frequency. Negative for dysuria, hematuria and vaginal discharge.   Musculoskeletal:  Positive for back pain.   Skin: Negative.    Allergic/Immunologic: Negative.    Neurological: Negative.    Hematological: Negative.    Psychiatric/Behavioral: Negative.     All other systems reviewed and are negative.  /82   Pulse 66   Temp 36.7 °C (98.1 °F)   Resp 12   Wt 72.6 kg (160 lb)   SpO2 95%   BMI 25.82 kg/m²     Objective   Physical Exam  Vitals and nursing note reviewed.   Constitutional:       Appearance: Normal appearance.   HENT:      Head: Normocephalic and atraumatic.   Cardiovascular:      Rate and Rhythm: Normal rate and regular rhythm.   Pulmonary:      Effort: Pulmonary effort is normal.      Breath sounds: Normal breath sounds.   Abdominal:      Palpations: Abdomen is soft.      Tenderness: There is abdominal tenderness (mild SP). There is no right CVA tenderness or left CVA tenderness.   Skin:     General: Skin is warm and dry.   Neurological:      General: No focal deficit present.      Mental Status: She is alert and oriented to person, place, and time.   Psychiatric:         Mood and Affect: Mood normal.         Behavior: Behavior normal.     UA dip-positive blood  and leuks    Assessment:  UTI    Plan:  Cipro 500 mg bid x 5 days  Urine cx sent and pending  Increase clear fluids-water, cranberry juice  Pcp follow up this week if not improving or worsening  ER visit anytime 24/7 for acute worsening or changing condition

## 2024-01-01 LAB — BACTERIA UR CULT: ABNORMAL

## 2024-01-08 RX ORDER — AZITHROMYCIN 250 MG/1
TABLET, FILM COATED ORAL
Qty: 6 TABLET | Refills: 0 | Status: SHIPPED | OUTPATIENT
Start: 2024-01-08 | End: 2024-01-13

## 2024-01-08 RX ORDER — BENZONATATE 100 MG/1
100 CAPSULE ORAL 2 TIMES DAILY
Qty: 10 CAPSULE | Refills: 0 | Status: SHIPPED | OUTPATIENT
Start: 2024-01-08 | End: 2024-02-23 | Stop reason: WASHOUT

## 2024-01-08 RX ORDER — METHYLPREDNISOLONE 4 MG/1
TABLET ORAL
Qty: 21 TABLET | Refills: 0 | Status: SHIPPED | OUTPATIENT
Start: 2024-01-08 | End: 2024-01-15

## 2024-01-08 NOTE — TELEPHONE ENCOUNTER
PT was treated by Dr Henley for a virtual for covid.      She quit taking paxlovid.  She said is there some kind of cough syrup the Dr could call in or to get a z pack?      She had a virtual 12/29/23    Congested    She went to urgent care last week with a UTI and took the meds for 5 days.

## 2024-01-18 DIAGNOSIS — E78.2 HYPERLIPEMIA, MIXED: ICD-10-CM

## 2024-01-18 RX ORDER — LOSARTAN POTASSIUM 50 MG/1
50 TABLET ORAL 2 TIMES DAILY
Qty: 180 TABLET | Refills: 1 | Status: SHIPPED | OUTPATIENT
Start: 2024-01-18 | End: 2024-04-29 | Stop reason: ALTCHOICE

## 2024-02-13 ENCOUNTER — TELEPHONE (OUTPATIENT)
Dept: PRIMARY CARE | Facility: CLINIC | Age: 84
End: 2024-02-13
Payer: MEDICARE

## 2024-02-13 NOTE — TELEPHONE ENCOUNTER
PT was at a [employer] and remembers taking her coat off, feeling warm and passed out.  Her niece called 911 and said she was out for 10 minutes.  Paramedics said her vitals were ok, no alarms going off.  They asked about her meds and food.      She has horrible stress because she has mold in her basement and has to move out until the mold is cleared up a second time.      She is allergic to one of the molds.    Because she didn't go to the hospital she was told to chart this with her PCP.      Cell is:  [phone]

## 2024-02-16 NOTE — TELEPHONE ENCOUNTER
PT CALLED TODAY. INQUIRING IF  HAD SEEN MESSAGE. I EXPLAINED THAT HE JUST CAME BACK INTO OFFICE TODAY. SHE FEELS HER FUTURE APPT IS TOO FAR OUT FOR  WHAT HAD HAPPENED.

## 2024-02-23 ENCOUNTER — OFFICE VISIT (OUTPATIENT)
Dept: PRIMARY CARE | Facility: CLINIC | Age: 84
End: 2024-02-23
Payer: MEDICARE

## 2024-02-23 ENCOUNTER — HOSPITAL ENCOUNTER (OUTPATIENT)
Dept: RADIOLOGY | Facility: EXTERNAL LOCATION | Age: 84
Discharge: HOME | End: 2024-02-23

## 2024-02-23 VITALS
TEMPERATURE: 97.2 F | OXYGEN SATURATION: 97 % | WEIGHT: 165.2 LBS | SYSTOLIC BLOOD PRESSURE: 143 MMHG | HEART RATE: 63 BPM | HEIGHT: 66 IN | BODY MASS INDEX: 26.55 KG/M2 | DIASTOLIC BLOOD PRESSURE: 70 MMHG

## 2024-02-23 DIAGNOSIS — N30.00 ACUTE CYSTITIS WITHOUT HEMATURIA: ICD-10-CM

## 2024-02-23 DIAGNOSIS — Z13.820 ENCOUNTER FOR OSTEOPOROSIS SCREENING IN ASYMPTOMATIC POSTMENOPAUSAL PATIENT: ICD-10-CM

## 2024-02-23 DIAGNOSIS — L20.84 INTRINSIC ECZEMA: ICD-10-CM

## 2024-02-23 DIAGNOSIS — Z78.0 ENCOUNTER FOR OSTEOPOROSIS SCREENING IN ASYMPTOMATIC POSTMENOPAUSAL PATIENT: ICD-10-CM

## 2024-02-23 DIAGNOSIS — F32.0 CURRENT MILD EPISODE OF MAJOR DEPRESSIVE DISORDER WITHOUT PRIOR EPISODE (CMS-HCC): ICD-10-CM

## 2024-02-23 DIAGNOSIS — Z12.31 ENCOUNTER FOR SCREENING MAMMOGRAM FOR MALIGNANT NEOPLASM OF BREAST: ICD-10-CM

## 2024-02-23 DIAGNOSIS — E78.2 HYPERLIPEMIA, MIXED: ICD-10-CM

## 2024-02-23 DIAGNOSIS — I10 BENIGN ESSENTIAL HYPERTENSION: ICD-10-CM

## 2024-02-23 DIAGNOSIS — Z13.820 SCREENING FOR OSTEOPOROSIS: ICD-10-CM

## 2024-02-23 DIAGNOSIS — F48.8 PSYCHOGENIC SYNCOPE: Primary | ICD-10-CM

## 2024-02-23 PROBLEM — L29.9 PRURITUS: Status: RESOLVED | Noted: 2023-10-03 | Resolved: 2024-02-23

## 2024-02-23 PROBLEM — J44.9 COPD MIXED TYPE (MULTI): Status: RESOLVED | Noted: 2023-10-03 | Resolved: 2024-02-23

## 2024-02-23 PROCEDURE — 99214 OFFICE O/P EST MOD 30 MIN: CPT | Performed by: INTERNAL MEDICINE

## 2024-02-23 PROCEDURE — 3077F SYST BP >= 140 MM HG: CPT | Performed by: INTERNAL MEDICINE

## 2024-02-23 PROCEDURE — 3078F DIAST BP <80 MM HG: CPT | Performed by: INTERNAL MEDICINE

## 2024-02-23 PROCEDURE — 1125F AMNT PAIN NOTED PAIN PRSNT: CPT | Performed by: INTERNAL MEDICINE

## 2024-02-23 PROCEDURE — 1036F TOBACCO NON-USER: CPT | Performed by: INTERNAL MEDICINE

## 2024-02-23 PROCEDURE — 1160F RVW MEDS BY RX/DR IN RCRD: CPT | Performed by: INTERNAL MEDICINE

## 2024-02-23 PROCEDURE — 1159F MED LIST DOCD IN RCRD: CPT | Performed by: INTERNAL MEDICINE

## 2024-02-23 PROCEDURE — 93000 ELECTROCARDIOGRAM COMPLETE: CPT | Performed by: INTERNAL MEDICINE

## 2024-02-23 RX ORDER — FAMOTIDINE 40 MG/1
40 TABLET, FILM COATED ORAL 2 TIMES DAILY
COMMUNITY

## 2024-02-23 RX ORDER — ACETAMINOPHEN 500 MG
TABLET ORAL DAILY
COMMUNITY

## 2024-02-23 RX ORDER — FLUOCINONIDE 0.5 MG/G
CREAM TOPICAL 2 TIMES DAILY
Qty: 45 G | Refills: 0 | Status: SHIPPED | OUTPATIENT
Start: 2024-02-23 | End: 2024-04-29 | Stop reason: ALTCHOICE

## 2024-02-23 RX ORDER — SERTRALINE HYDROCHLORIDE 50 MG/1
50 TABLET, FILM COATED ORAL DAILY
Qty: 30 TABLET | Refills: 2 | Status: SHIPPED | OUTPATIENT
Start: 2024-02-23 | End: 2024-03-19 | Stop reason: ALTCHOICE

## 2024-02-23 RX ORDER — FLUOCINONIDE 0.5 MG/G
CREAM TOPICAL 2 TIMES DAILY
Qty: 45 G | Refills: 0 | Status: SHIPPED | OUTPATIENT
Start: 2024-02-23 | End: 2024-02-23 | Stop reason: SDUPTHER

## 2024-02-23 NOTE — PROGRESS NOTES
Subjective   Patient ID: Rita Vazquez is a 83 y.o. female who presents for Follow-up (She fainted at [employer] and she was told to come get evaluated, all vitals and everything was normal at the time. ).    Assessment/Plan   Fainting near syncope without loss of consciousness or witnessed seizure    EKG done discussed with the patient most likely related to the orthostatic hypotension with stress disorder advised to get cardiology evaluation send physical therapy for gait and balance    Eczematous dermatitis continue Lidex cream Benadryl    Anxiety depression Zoloft PHQ less than 5    Hyperlipidemia Lipitor 10 mg a day monitor LFT lipid CPK once a year    COPD continue Symbicort advised to get PFT    Gastritis continue Pepcid 40 mg a day monitor magnesium    Hypertension continue losartan 50 mg a day BMP twice a year    Refer patient to gastroenterology.  Cardiology.  Dermatology.  Follow-up  Problem List Items Addressed This Visit       Encounter for osteoporosis screening in asymptomatic postmenopausal patient    Relevant Orders    XR DEXA bone density (Completed)    Hyperlipemia, mixed    Relevant Orders    CBC and Auto Differential    Comprehensive Metabolic Panel    Lipid Panel    TSH with reflex to Free T4 if abnormal    Microscopic Only, Urine    Benign essential hypertension     Patients BP readings reviewed and addressed, as we age our arteries turn stiffer and less elastic. Restricting salt consumption and staying physically fit with regular exercise regimen is the only way to keep our vasculature less tonic. Studies have shown that keeping ideal body wt, exercise routine about 140 to 150 minutes a week, eating variety of plant based diet and drinking plentiful water are quite helpful. Monitor BP twice or once a week at home and bring log to be reviewed by me. Uncontrolled BP has long term consequences including heart failure, myocardial infarction, accelerated atherosclerosis and kidney dysfunction.  Therapy reviewed and explained.           Relevant Orders    CBC and Auto Differential    Comprehensive Metabolic Panel    Lipid Panel    TSH with reflex to Free T4 if abnormal    Microscopic Only, Urine    Psychogenic syncope - Primary    Relevant Orders    CBC and Auto Differential    Comprehensive Metabolic Panel    Lipid Panel    TSH with reflex to Free T4 if abnormal    Microscopic Only, Urine    ECG 12 lead (Clinic Performed) (Completed)    Intrinsic eczema    Current mild episode of major depressive disorder without prior episode (CMS/HCC)     Patient was evaluated today, problem list was reviewed, problems and concerns addressed, Rx list reviewed and updated, lab and tests were noted and reviewed. Life style changes were discussed, always it works better if we eat plant based diet and plenty of fibres and roughage. Consume adequate amount of water and avoid alcohol, light to moderate physical activities and stress reduction are always beneficial for ongoing physical well being. Do not forget to have 6 to 7 hours of sleep regularly and avoid late night julio cesar screen exposure.    HPI 83-year-old patient was sister at home was lightheaded dizzy near syncope without loss of consciousness or fall call the EMS vital was checked everything was good this happened only 1 time    Negative for visual or speech problem    Negative for tingling numbness or headache or chest pain at time of the episode    Moderate anxiety about taking care of herself and also the social economical crisis going through the house with the fungus    Review laboratory medication had a nonspecific skin lesion refer to dermatology    Moderate anxiety depression with syncope given Zoloft refer to psych    Recently went for colonoscopy find out some polyps    Father had prostate cancer mother have eczema  Past Medical History:   Diagnosis Date    Anemia     GERD (gastroesophageal reflux disease)     Hypertension     Personal history of other  diseases of the musculoskeletal system and connective tissue 09/28/2022    History of arthritis    Personal history of other diseases of the respiratory system 09/28/2022    History of sinus problem    Personal history of other infectious and parasitic diseases     History of herpes genitalis    Urinary tract infection, site not specified 11/21/2021    Acute UTI     Past Surgical History:   Procedure Laterality Date    BACK SURGERY      JOINT REPLACEMENT      KNEE      OTHER SURGICAL HISTORY  09/28/2022    Knee replacement    OTHER SURGICAL HISTORY  09/28/2022    Back surgery    TONSILLECTOMY      WISDOM TOOTH EXTRACTION       Allergies   Allergen Reactions    Amoxicillin Other    Bisacodyl Unknown    Metronidazole Other    Nitrofurantoin Unknown     04/26/2005;DIZZINESS, 04/26/2005;DIZZINESS    Sulfa (Sulfonamide Antibiotics) Unknown     04/26/2005;DIZZINESS, 04/26/2005;DIZZINESS     Current Outpatient Medications   Medication Sig Dispense Refill    atorvastatin (Lipitor) 10 mg tablet Take 1 tablet (10 mg) by mouth once daily.      budesonide-formoteroL (Symbicort) 80-4.5 mcg/actuation inhaler Inhale 2 puffs twice a day 10.2 each 3    calcium carbonate/vitamin D3 (CALTRATE WITH VITAMIN D3 ORAL) Take by mouth.      cholecalciferol (Vitamin D3) 5,000 Units tablet Take by mouth once daily.      famotidine (Pepcid) 40 mg tablet Take 1 tablet (40 mg) by mouth 2 times a day.      ipratropium-albuteroL (Duo-Neb) 0.5-2.5 mg/3 mL nebulizer solution inhale the contents of 1 vial via nebulizer every 12 hours      losartan (Cozaar) 50 mg tablet Take 1 tablet (50 mg) by mouth 2 times a day. 180 tablet 1    multivitamin tablet Take 1 tablet by mouth once daily.      valACYclovir (Valtrex) 500 mg tablet Take 1 tablet (500 mg) by mouth once daily.       No current facility-administered medications for this visit.     Family History   Problem Relation Name Age of Onset    Parkinsonism Mother      Prostate cancer Father Kirk      Cancer Father Kirk      Social History     Socioeconomic History    Marital status:      Spouse name: None    Number of children: None    Years of education: None    Highest education level: None   Occupational History    None   Tobacco Use    Smoking status: Former     Packs/day: 0.50     Years: 30.00     Additional pack years: 0.00     Total pack years: 15.00     Types: Cigarettes    Smokeless tobacco: Never   Substance and Sexual Activity    Alcohol use: Never    Drug use: Never    Sexual activity: Not Currently     Partners: Male   Other Topics Concern    None   Social History Narrative    None     Social Determinants of Health     Financial Resource Strain: Not on file   Food Insecurity: Not on file   Transportation Needs: Not on file   Physical Activity: Not on file   Stress: Not on file   Social Connections: Not on file   Intimate Partner Violence: Not on file   Housing Stability: Not on file     Immunization History   Administered Date(s) Administered    Flu vaccine, quadrivalent, high-dose, preservative free, age 65y+ (FLUZONE) 10/23/2023    Flu vaccine, quadrivalent, no egg protein, age 6 month or greater (FLUCELVAX) 10/30/2017    Influenza, High Dose Seasonal, Preservative Free 10/25/2016, 10/11/2018    Influenza, Seasonal, Quadrivalent, Adjuvanted 10/01/2020, 10/06/2021, 10/07/2022    Influenza, seasonal, injectable 11/09/2011    Influenza, seasonal, injectable, preservative free 10/12/2015    Moderna COVID-19 vaccine, Fall 2023, 12 yeasrs and older (50mcg/0.5mL) 10/09/2023    Moderna COVID-19 vaccine, bivalent, blue cap/gray label *Check age/dose* 11/21/2022    Moderna SARS-CoV-2 Vaccination 01/07/2021, 02/03/2021, 10/29/2021, 06/10/2022    Pneumococcal conjugate vaccine, 13-valent (PREVNAR 13) 10/12/2015    Pneumococcal conjugate vaccine, 20-valent (PREVNAR 20) 06/30/2023    Pneumococcal polysaccharide vaccine, 23-valent, age 2 years and older (PNEUMOVAX 23) 11/02/2020    Tdap vaccine, age 7  "year and older (BOOSTRIX, ADACEL) 06/30/2023    Zoster vaccine, recombinant, adult (SHINGRIX) 09/24/2019, 09/27/2019, 12/03/2019    Zoster, live 01/10/2017       Review of Systems  Review of systems is otherwise negative unless stated above or in history of present illness.    Objective   Visit Vitals  /70 (BP Location: Left arm, Patient Position: Sitting, BP Cuff Size: Adult)   Pulse 63   Temp 36.2 °C (97.2 °F)   Ht 1.676 m (5' 6\")   Wt 74.9 kg (165 lb 3.2 oz)   SpO2 97%   BMI 26.66 kg/m²   Smoking Status Former   BSA 1.87 m²     Physical Exam  Constitutional:       General: not in acute distress.   HENT:      Head: Normocephalic and atraumatic.      Nose: Nose normal.   Eyes:      Extraocular Movements: Extraocular movements intact.      Conjunctiva/sclera: Conjunctivae normal.   Cardiovascular:      Rate and Rhythm: Normal rate ,  No M/R/G  Pulmonary:      Effort: Pulmonary effort is normal.      Breath sounds: Normal, Bilat Equal AE  Skin: Dermatitis eczema     General: Skin is warm.   Neurological:      Mental Status: He is alert and oriented to person, place, and time.   Psychiatric:    Anxiety depressive panic disorder       Musculoskeletal   FROM in all extremitirs,  Joint-no swelling or tenderness    Office Visit on 12/30/2023   Component Date Value Ref Range Status    POC Color, Urine 12/30/2023 Yellow  Straw, Yellow, Light-Yellow Final    POC Appearance, Urine 12/30/2023 Cloudy (A)  Clear Final    POC Glucose, Urine 12/30/2023 NEGATIVE  NEGATIVE mg/dl Final    POC Bilirubin, Urine 12/30/2023 NEGATIVE  NEGATIVE Final    POC Ketones, Urine 12/30/2023 NEGATIVE  NEGATIVE mg/dl Final    POC Specific Gravity, Urine 12/30/2023 1.020  1.005 - 1.035 Final    POC Blood, Urine 12/30/2023 MODERATE (2+) (A)  NEGATIVE Final    POC PH, Urine 12/30/2023 6.0  No Reference Range Established PH Final    POC Protein, Urine 12/30/2023 30 (1+)  NEGATIVE, 30 (1+) mg/dl Final    POC Urobilinogen, Urine 12/30/2023 0.2  0.2, " 1.0 EU/DL Final    Poc Nitrite, Urine 12/30/2023 NEGATIVE  NEGATIVE Final    POC Leukocytes, Urine 12/30/2023 MODERATE (2+) (A)  NEGATIVE Final    Urine Culture 12/30/2023 20,000 - 80,000 Escherichia coli (A)   Final       Radiology: Reviewed imaging in powerchart.  No results found.      Charting was completed using voice recognition technology and may include unintended errors.

## 2024-02-27 DIAGNOSIS — E78.2 HYPERLIPEMIA, MIXED: ICD-10-CM

## 2024-02-27 RX ORDER — ATORVASTATIN CALCIUM 10 MG/1
10 TABLET, FILM COATED ORAL DAILY
Qty: 90 TABLET | Refills: 3 | Status: SHIPPED | OUTPATIENT
Start: 2024-02-27

## 2024-03-19 ENCOUNTER — OFFICE VISIT (OUTPATIENT)
Dept: PRIMARY CARE | Facility: CLINIC | Age: 84
End: 2024-03-19
Payer: MEDICARE

## 2024-03-19 VITALS
TEMPERATURE: 96.8 F | SYSTOLIC BLOOD PRESSURE: 124 MMHG | WEIGHT: 165 LBS | HEART RATE: 73 BPM | HEIGHT: 66 IN | DIASTOLIC BLOOD PRESSURE: 73 MMHG | OXYGEN SATURATION: 97 % | BODY MASS INDEX: 26.52 KG/M2

## 2024-03-19 DIAGNOSIS — I10 BENIGN ESSENTIAL HYPERTENSION: ICD-10-CM

## 2024-03-19 DIAGNOSIS — K21.9 GERD WITHOUT ESOPHAGITIS: ICD-10-CM

## 2024-03-19 DIAGNOSIS — E78.2 HYPERLIPEMIA, MIXED: ICD-10-CM

## 2024-03-19 DIAGNOSIS — Z00.00 MEDICARE ANNUAL WELLNESS VISIT, SUBSEQUENT: Primary | ICD-10-CM

## 2024-03-19 DIAGNOSIS — J44.9 COPD MIXED TYPE (MULTI): ICD-10-CM

## 2024-03-19 PROBLEM — F48.8 PSYCHOGENIC SYNCOPE: Status: RESOLVED | Noted: 2024-02-23 | Resolved: 2024-03-19

## 2024-03-19 PROBLEM — Z13.820 ENCOUNTER FOR OSTEOPOROSIS SCREENING IN ASYMPTOMATIC POSTMENOPAUSAL PATIENT: Status: RESOLVED | Noted: 2023-10-03 | Resolved: 2024-03-19

## 2024-03-19 PROBLEM — Z78.0 ENCOUNTER FOR OSTEOPOROSIS SCREENING IN ASYMPTOMATIC POSTMENOPAUSAL PATIENT: Status: RESOLVED | Noted: 2023-10-03 | Resolved: 2024-03-19

## 2024-03-19 PROBLEM — L20.84 INTRINSIC ECZEMA: Status: RESOLVED | Noted: 2024-02-23 | Resolved: 2024-03-19

## 2024-03-19 PROBLEM — F32.0 CURRENT MILD EPISODE OF MAJOR DEPRESSIVE DISORDER WITHOUT PRIOR EPISODE (CMS-HCC): Status: RESOLVED | Noted: 2024-02-23 | Resolved: 2024-03-19

## 2024-03-19 PROCEDURE — 3074F SYST BP LT 130 MM HG: CPT | Performed by: INTERNAL MEDICINE

## 2024-03-19 PROCEDURE — 1170F FXNL STATUS ASSESSED: CPT | Performed by: INTERNAL MEDICINE

## 2024-03-19 PROCEDURE — 3078F DIAST BP <80 MM HG: CPT | Performed by: INTERNAL MEDICINE

## 2024-03-19 PROCEDURE — 1036F TOBACCO NON-USER: CPT | Performed by: INTERNAL MEDICINE

## 2024-03-19 PROCEDURE — 1160F RVW MEDS BY RX/DR IN RCRD: CPT | Performed by: INTERNAL MEDICINE

## 2024-03-19 PROCEDURE — 99497 ADVNCD CARE PLAN 30 MIN: CPT | Performed by: INTERNAL MEDICINE

## 2024-03-19 PROCEDURE — 1159F MED LIST DOCD IN RCRD: CPT | Performed by: INTERNAL MEDICINE

## 2024-03-19 PROCEDURE — G0439 PPPS, SUBSEQ VISIT: HCPCS | Performed by: INTERNAL MEDICINE

## 2024-03-19 ASSESSMENT — PATIENT HEALTH QUESTIONNAIRE - PHQ9
SUM OF ALL RESPONSES TO PHQ9 QUESTIONS 1 AND 2: 0
1. LITTLE INTEREST OR PLEASURE IN DOING THINGS: NOT AT ALL
2. FEELING DOWN, DEPRESSED OR HOPELESS: NOT AT ALL
SUM OF ALL RESPONSES TO PHQ9 QUESTIONS 1 AND 2: 0
2. FEELING DOWN, DEPRESSED OR HOPELESS: NOT AT ALL
1. LITTLE INTEREST OR PLEASURE IN DOING THINGS: NOT AT ALL

## 2024-03-19 ASSESSMENT — ACTIVITIES OF DAILY LIVING (ADL)
MANAGING_FINANCES: INDEPENDENT
BATHING: INDEPENDENT
TAKING_MEDICATION: INDEPENDENT
DRESSING: INDEPENDENT
DOING_HOUSEWORK: INDEPENDENT
GROCERY_SHOPPING: INDEPENDENT

## 2024-03-19 NOTE — PROGRESS NOTES
Assessment and Plan:  Problem List Items Addressed This Visit       Medicare annual wellness visit, subsequent - Primary    Hyperlipemia, mixed     Keep LDL cholesterol less than 100 LFT lipid CPK once a year         Benign essential hypertension     Keep blood pressure less than 120/80 BMP twice a year         GERD without esophagitis    COPD mixed type (CMS/HCC)     Pt has moderate to severe COPD. It is progressive disease, use of MDI and proper technique discussed, rinse mouth after inhaled corticosteroids. Notify if progressive dyspnea or cough or lethargy, monitor oxygen saturation if possible with home based pulse oximetry. Avoid hospitalization and call us if any flare ups are about to happen, be sure that annual flu vaccine are uptodate and review need for pneumococcal vaccine. Light to moderate low impact exercises are very helpful and deep breathing  exercises are beneficial in chronic lung diseases.               Chief Complaint:   Annual Medicare Wellness Office Exam/Comprehensive Problem Focused Follow Up and Physical Exam cough congestion shortness of breath      HPI: 83-year-old patient  have her 2 children    1 daughter  from motor vehicle accident    Sister have hypertension    Mother have a sepsis    Parkinson    Father have prostate cancer    Personal history of hypertension hyperlipidemia COPD proteinuria obesity arthritis    Chronic lung disease seen by pulmonary service Dr. Soares    Negative for depression suicide or dementia            Patient Active Problem List:  Patient Active Problem List   Diagnosis    Medicare annual wellness visit, subsequent    Hyperlipemia, mixed    Benign essential hypertension    GERD without esophagitis    COPD mixed type (CMS/HCC)          Comprehensive Medical/Surgical/Social/Family History:  Family History   Problem Relation Name Age of Onset    Parkinsonism Mother      Prostate cancer Father Kirk     Cancer Father Kirk        Past Medical History:    Diagnosis Date    Anemia     GERD (gastroesophageal reflux disease)     Hypertension     Personal history of other diseases of the musculoskeletal system and connective tissue 09/28/2022    History of arthritis    Personal history of other diseases of the respiratory system 09/28/2022    History of sinus problem    Personal history of other infectious and parasitic diseases     History of herpes genitalis    Urinary tract infection, site not specified 11/21/2021    Acute UTI       Past Surgical History:   Procedure Laterality Date    BACK SURGERY      JOINT REPLACEMENT      KNEE      OTHER SURGICAL HISTORY  09/28/2022    Knee replacement    OTHER SURGICAL HISTORY  09/28/2022    Back surgery    TONSILLECTOMY      WISDOM TOOTH EXTRACTION         Social History     Socioeconomic History    Marital status:      Spouse name: None    Number of children: None    Years of education: None    Highest education level: None   Occupational History    None   Tobacco Use    Smoking status: Former     Packs/day: 0.50     Years: 30.00     Additional pack years: 0.00     Total pack years: 15.00     Types: Cigarettes    Smokeless tobacco: Never   Substance and Sexual Activity    Alcohol use: Never    Drug use: Never    Sexual activity: Not Currently     Partners: Male   Other Topics Concern    None   Social History Narrative    None     Social Determinants of Health     Financial Resource Strain: Not on file   Food Insecurity: Not on file   Transportation Needs: Not on file   Physical Activity: Not on file   Stress: Not on file   Social Connections: Not on file   Intimate Partner Violence: Not on file   Housing Stability: Not on file       Tobacco/Alcohol/Opioid use, as well as Illicit Drug Use was screened for/reviewed and documented in Social Documentation section of the chart and medication list as appropriate    Allergies and Medications  Allergies   Allergen Reactions    Amoxicillin Other    Bisacodyl Unknown     Metronidazole Other    Nitrofurantoin Unknown     04/26/2005;DIZZINESS, 04/26/2005;DIZZINESS    Sulfa (Sulfonamide Antibiotics) Unknown     04/26/2005;DIZZINESS, 04/26/2005;DIZZINESS     Current Outpatient Medications   Medication Sig Dispense Refill    atorvastatin (Lipitor) 10 mg tablet Take 1 tablet (10 mg) by mouth once daily. 90 tablet 3    budesonide-formoteroL (Symbicort) 80-4.5 mcg/actuation inhaler Inhale 2 puffs twice a day 10.2 each 3    calcium carbonate/vitamin D3 (CALTRATE WITH VITAMIN D3 ORAL) Take by mouth.      cholecalciferol (Vitamin D3) 5,000 Units tablet Take by mouth once daily.      famotidine (Pepcid) 40 mg tablet Take 1 tablet (40 mg) by mouth 2 times a day.      fluocinonide (Lidex) 0.05 % cream Apply topically 2 times a day. 45 g 0    ipratropium-albuteroL (Duo-Neb) 0.5-2.5 mg/3 mL nebulizer solution inhale the contents of 1 vial via nebulizer every 12 hours      losartan (Cozaar) 50 mg tablet Take 1 tablet (50 mg) by mouth 2 times a day. 180 tablet 1    multivitamin tablet Take 1 tablet by mouth once daily.      valACYclovir (Valtrex) 500 mg tablet Take 1 tablet (500 mg) by mouth once daily.       No current facility-administered medications for this visit.       Medications and Supplements  prescribed by me and other practitioners or clinical pharmacist (such as prescriptions, OTC's, herbal therapies and supplements) were reviewed and documented in the medical record.     Advance directives  Advanced Care Planning (including a Living Will, Healthcare POA, as well as specific end of life choices and/or directives), was discussed for approximately 16 minutes with the patient and/or surrogate, voluntarily, and documented in the Problem List of the medical record.     Cardiac Risk Assessment  Cardiovascular risk was discussed and, if needed, lifestyle modifications recommended, including nutritional choices, exercise, and elimination of habits contributing to risk. We agreed on a plan to  "reduce the current cardiovascular risk based on above discussion as needed.  Aspirin use/disuse was discussed and documented in the Problem List of the medical record after reviewing the updated guidelines below:    Consider low dose Aspirin ( mg) use if the benefit for cardiovascular disease prevention outweighs risk for bleeding complications.   In general, low dose ASA should be considered:  In patients WITHOUT prior MI/stroke/PAD (primary prevention):   a. Age <60: Use if 10-year cardiovascular disease risk >20%, with discussion of risks and benefits with patient  b. Age 60-<70: Use if 10-year cardiovascular disease risk >20% and low bleeding (e.g., gastrointenstinal) risk, with discussion of risks and benefits with patient  c. Age >=70: Do not use    In patients WITH prior MI/stroke/PAD (secondary prevention):   Generally use unless extremely high bleeding (e.g., gastrointenstinal) risk, with discussion of risks and benefits with patient    ROS otherwise negative aside from what was mentioned above in HPI.    Visit Vitals  /73 (BP Location: Left arm, Patient Position: Sitting, BP Cuff Size: Adult)   Pulse 73   Temp 36 °C (96.8 °F)   Ht 1.676 m (5' 6\")   Wt 74.8 kg (165 lb)   SpO2 97%   BMI 26.63 kg/m²   Smoking Status Former   BSA 1.87 m²       Physical Exam  Physical Exam:    Appearance: Alert, oriented , cooperative,  in no acute distress. Well nourished & well hydrated.    Skin: Intact,  dry skin, no lesions, rash, petechiae or purpura.     Eyes: PERRLA, EOMs intact,  Conjunctiva pink with no redness or exudates. Cornea & anterior chamber are clear, Eyelids without lesions. No scleral icterus.     ENT: Hearing grossly intact. External auditory canals patent, tympanic membranes intact with visible landmarks. Nares patent, mucus membranes moist. Dentition without lesions. Pharynx clear, uvula midline.     Neck: Supple, without meningismus. Thyroid not palpable. Trachea at midline. No " lymphadenopathy.    Pulmonary: Expiratory rhonchi with crackles    Cardiac: Heart murmur    Abdomen: Soft, nontender, active bowel sounds.  No palpable organomegaly.  No rebound or guarding.  No CVA tenderness.    Genitourinary: Exam deferred.    Musculoskeletal: Arthralgia  Neurological:  Cranial nerves II through XII are grossly intact, finger-nose touch is normal, normal sensation, no weakness, no focal findings identified.    Psychiatric: Appropriate mood and affect.         During the course of the visit the patient was educated and counseled about age appropriate screening and preventive services. Completed preventive screenings were documented in the chart and orders were placed for outstanding screenings/procedures as documented in the Assessment and Plan.    Patient Instructions (the written plan) was given to the patient at check out.    Charting was completed using voice recognition technology and may include unintended errors.

## 2024-04-15 ENCOUNTER — OFFICE VISIT (OUTPATIENT)
Dept: PRIMARY CARE | Facility: CLINIC | Age: 84
End: 2024-04-15
Payer: MEDICARE

## 2024-04-15 VITALS
OXYGEN SATURATION: 97 % | SYSTOLIC BLOOD PRESSURE: 143 MMHG | BODY MASS INDEX: 26.81 KG/M2 | HEART RATE: 71 BPM | WEIGHT: 166.8 LBS | TEMPERATURE: 96.6 F | HEIGHT: 66 IN | DIASTOLIC BLOOD PRESSURE: 65 MMHG

## 2024-04-15 DIAGNOSIS — L29.9 PRURITUS: ICD-10-CM

## 2024-04-15 DIAGNOSIS — S40.021D CONTUSION OF BOTH UPPER EXTREMITIES, SUBSEQUENT ENCOUNTER: ICD-10-CM

## 2024-04-15 DIAGNOSIS — T07.XXXA MULTIPLE BRUISES: Primary | ICD-10-CM

## 2024-04-15 DIAGNOSIS — E78.2 HYPERLIPEMIA, MIXED: ICD-10-CM

## 2024-04-15 DIAGNOSIS — S40.022D CONTUSION OF BOTH UPPER EXTREMITIES, SUBSEQUENT ENCOUNTER: ICD-10-CM

## 2024-04-15 DIAGNOSIS — M79.7 FIBROMYALGIA: ICD-10-CM

## 2024-04-15 DIAGNOSIS — I10 BENIGN ESSENTIAL HYPERTENSION: ICD-10-CM

## 2024-04-15 PROBLEM — S40.021A CONTUSION OF BOTH UPPER EXTREMITIES: Status: ACTIVE | Noted: 2024-04-15

## 2024-04-15 PROBLEM — S40.022A CONTUSION OF BOTH UPPER EXTREMITIES: Status: ACTIVE | Noted: 2024-04-15

## 2024-04-15 PROCEDURE — 3078F DIAST BP <80 MM HG: CPT | Performed by: INTERNAL MEDICINE

## 2024-04-15 PROCEDURE — 99214 OFFICE O/P EST MOD 30 MIN: CPT | Performed by: INTERNAL MEDICINE

## 2024-04-15 PROCEDURE — 1159F MED LIST DOCD IN RCRD: CPT | Performed by: INTERNAL MEDICINE

## 2024-04-15 PROCEDURE — 1036F TOBACCO NON-USER: CPT | Performed by: INTERNAL MEDICINE

## 2024-04-15 PROCEDURE — 1160F RVW MEDS BY RX/DR IN RCRD: CPT | Performed by: INTERNAL MEDICINE

## 2024-04-15 PROCEDURE — G2211 COMPLEX E/M VISIT ADD ON: HCPCS | Performed by: INTERNAL MEDICINE

## 2024-04-15 PROCEDURE — 3077F SYST BP >= 140 MM HG: CPT | Performed by: INTERNAL MEDICINE

## 2024-04-15 RX ORDER — PREDNISONE 5 MG/1
5 TABLET ORAL DAILY
Qty: 10 TABLET | Refills: 0 | Status: SHIPPED | OUTPATIENT
Start: 2024-04-15 | End: 2024-04-29 | Stop reason: ALTCHOICE

## 2024-04-15 RX ORDER — SPIRONOLACTONE 25 MG/1
12.5 TABLET ORAL DAILY
Qty: 5 TABLET | Refills: 0 | Status: SHIPPED | OUTPATIENT
Start: 2024-04-15 | End: 2024-04-29 | Stop reason: ALTCHOICE

## 2024-04-15 NOTE — PROGRESS NOTES
Subjective   Patient ID: Rita Vazquez is a 83 y.o. female who presents for Follow-up (Blood spots on arms- since Friday ) and Fatigue.    Assessment/Plan     Problem List Items Addressed This Visit       Hyperlipemia, mixed     Keep LDL less than 100 monitor CMP lipid CPK once a year         Benign essential hypertension     Systolic hypertension with hirsutism add on Aldactone 12.5 mg a day monitor BMP         Pruritus    Relevant Orders    Rheumatoid Factor    CHANDNI without Reflex KIRA    C-Reactive Protein    Sedimentation Rate    Anti-DNA Antibody, Double-Stranded    CBC and Auto Differential    Contusion of both upper extremities    Relevant Orders    Rheumatoid Factor    CHANDNI without Reflex KIRA    C-Reactive Protein    Sedimentation Rate    Anti-DNA Antibody, Double-Stranded    CBC and Auto Differential    Fibromyalgia    Relevant Orders    Rheumatoid Factor    CHANDNI without Reflex KIRA    C-Reactive Protein    Sedimentation Rate    Anti-DNA Antibody, Double-Stranded    CBC and Auto Differential    Multiple bruises - Primary     Patient was evaluated today, problem list was reviewed, problems and concerns addressed, Rx list reviewed and updated, lab and tests were noted and reviewed. Life style changes were discussed, always it works better if we eat plant based diet and plenty of fibres and roughage. Consume adequate amount of water and avoid alcohol, light to moderate physical activities and stress reduction are always beneficial for ongoing physical well being. Do not forget to have 6 to 7 hours of sleep regularly and avoid late night julio cesar screen exposure.    HPI  86-year-old patient have hypertension hyperlipidemia complaining of the multiple bruises affecting the both arms onset acutely since Friday aggravated by activity relieved with the rest associated problem with the pruritus chronic fatigue fibromyalgia    Negative for lymphoma leukemia lupus sarcoidosis or rheumatoid arthritis    Negative for  trauma    Negative for fever chills or new vaccine or any chemical use or abuse    Review laboratory medication discussed with the patient    Will do autoimmune workup if problems continue advised to see dermatology Dr. Massimo ha  Past Medical History:   Diagnosis Date    Anemia     GERD (gastroesophageal reflux disease)     Hypertension     Personal history of other diseases of the musculoskeletal system and connective tissue 09/28/2022    History of arthritis    Personal history of other diseases of the respiratory system 09/28/2022    History of sinus problem    Personal history of other infectious and parasitic diseases     History of herpes genitalis    Urinary tract infection, site not specified 11/21/2021    Acute UTI     Past Surgical History:   Procedure Laterality Date    BACK SURGERY      JOINT REPLACEMENT      KNEE      OTHER SURGICAL HISTORY  09/28/2022    Knee replacement    OTHER SURGICAL HISTORY  09/28/2022    Back surgery    TONSILLECTOMY      WISDOM TOOTH EXTRACTION       Allergies   Allergen Reactions    Amoxicillin Other    Bisacodyl Unknown    Metronidazole Other    Nitrofurantoin Unknown     04/26/2005;DIZZINESS, 04/26/2005;DIZZINESS    Sulfa (Sulfonamide Antibiotics) Unknown     04/26/2005;DIZZINESS, 04/26/2005;DIZZINESS     Current Outpatient Medications   Medication Sig Dispense Refill    atorvastatin (Lipitor) 10 mg tablet Take 1 tablet (10 mg) by mouth once daily. 90 tablet 3    budesonide-formoteroL (Symbicort) 80-4.5 mcg/actuation inhaler Inhale 2 puffs twice a day 10.2 each 3    calcium carbonate/vitamin D3 (CALTRATE WITH VITAMIN D3 ORAL) Take by mouth.      cholecalciferol (Vitamin D3) 5,000 Units tablet Take by mouth once daily.      famotidine (Pepcid) 40 mg tablet Take 1 tablet (40 mg) by mouth 2 times a day.      fluocinonide (Lidex) 0.05 % cream Apply topically 2 times a day. 45 g 0    ipratropium-albuteroL (Duo-Neb) 0.5-2.5 mg/3 mL nebulizer solution inhale the contents of 1  vial via nebulizer every 12 hours      losartan (Cozaar) 50 mg tablet Take 1 tablet (50 mg) by mouth 2 times a day. 180 tablet 1    multivitamin tablet Take 1 tablet by mouth once daily.      valACYclovir (Valtrex) 500 mg tablet Take 1 tablet (500 mg) by mouth once daily.       No current facility-administered medications for this visit.     Family History   Problem Relation Name Age of Onset    Parkinsonism Mother      Prostate cancer Father Kirk     Cancer Father Kirk      Social History     Socioeconomic History    Marital status:      Spouse name: None    Number of children: None    Years of education: None    Highest education level: None   Occupational History    None   Tobacco Use    Smoking status: Former     Current packs/day: 0.50     Average packs/day: 0.5 packs/day for 30.0 years (15.0 ttl pk-yrs)     Types: Cigarettes    Smokeless tobacco: Never   Substance and Sexual Activity    Alcohol use: Never    Drug use: Never    Sexual activity: Not Currently     Partners: Male   Other Topics Concern    None   Social History Narrative    None     Social Determinants of Health     Financial Resource Strain: Not on file   Food Insecurity: Not on file   Transportation Needs: Not on file   Physical Activity: Not on file   Stress: Not on file   Social Connections: Not on file   Intimate Partner Violence: Not on file   Housing Stability: Not on file     Immunization History   Administered Date(s) Administered    Flu vaccine (IIV4), preservative free *Check age/dose* 11/09/2011, 10/12/2015, 10/25/2016, 10/30/2017, 10/11/2018, 10/01/2020, 10/06/2021, 10/07/2022    Flu vaccine, quadrivalent, high-dose, preservative free, age 65y+ (FLUZONE) 10/23/2023    Flu vaccine, quadrivalent, no egg protein, age 6 month or greater (FLUCELVAX) 10/30/2017    Influenza, High Dose Seasonal, Preservative Free 10/25/2016, 10/11/2018    Influenza, Seasonal, Quadrivalent, Adjuvanted 10/01/2020, 10/06/2021, 10/07/2022    Influenza,  "seasonal, injectable 11/09/2011    Influenza, seasonal, injectable, preservative free 10/12/2015    Moderna COVID-19 vaccine, Fall 2023, 12 yeasrs and older (50mcg/0.5mL) 10/09/2023    Moderna COVID-19 vaccine, bivalent, blue cap/gray label *Check age/dose* 11/21/2022    Moderna SARS-CoV-2 Vaccination 01/07/2021, 02/03/2021, 10/29/2021, 06/10/2022    Pneumococcal conjugate vaccine, 13-valent (PREVNAR 13) 10/12/2015    Pneumococcal conjugate vaccine, 20-valent (PREVNAR 20) 06/30/2023    Pneumococcal polysaccharide vaccine, 23-valent, age 2 years and older (PNEUMOVAX 23) 11/02/2020    RESPIRATORY SYNCYTIAL VIRUS (RSV), ELIGIBLE PREGNANT PTS, 0.5 ML (ABRYSVO) 12/20/2023    Tdap vaccine, age 7 year and older (BOOSTRIX, ADACEL) 06/30/2023    Zoster vaccine, recombinant, adult (SHINGRIX) 09/24/2019, 09/27/2019, 12/03/2019    Zoster, Unspecified 09/27/2019, 12/03/2019    Zoster, live 01/10/2017       Review of Systems  Review of systems is otherwise negative unless stated above or in history of present illness.    Objective   Visit Vitals  /65 (BP Location: Left arm, Patient Position: Sitting, BP Cuff Size: Large adult)   Pulse 71   Temp 35.9 °C (96.6 °F)   Ht 1.676 m (5' 6\")   Wt 75.7 kg (166 lb 12.8 oz)   SpO2 97%   BMI 26.92 kg/m²   Smoking Status Former   BSA 1.88 m²     Physical Exam  Constitutional: BMI 26     General: not in acute distress.   HENT:      Head: Normocephalic and atraumatic.      Nose: Nose normal.   Eyes: No jaundice     Extraocular Movements: Extraocular movements intact.      Conjunctiva/sclera: Conjunctivae normal.   Cardiovascular: Heart murmur     Rate and Rhythm: Normal rate ,  No M/R/G  Pulmonary:      Effort: Pulmonary effort is normal.      Breath sounds: Normal, Bilat Equal AE  Skin: Multiple bruises affecting the both forearm pruritus     General: Skin is warm.   Neurological: Neurology     Mental Status: He is alert and oriented to person, place, and time.   Psychiatric: Anxiety      "   Mood and Affect: Mood normal.         Behavior: Behavior normal.   Musculoskeletal   FROM in all extremitirs,  Joint-no swelling or tenderness  History of the UTI repeat UA CNS if the hematuria continues see Dr. Harris urology  Office Visit on 12/30/2023   Component Date Value Ref Range Status    POC Color, Urine 12/30/2023 Yellow  Straw, Yellow, Light-Yellow Final    POC Appearance, Urine 12/30/2023 Cloudy (A)  Clear Final    POC Glucose, Urine 12/30/2023 NEGATIVE  NEGATIVE mg/dl Final    POC Bilirubin, Urine 12/30/2023 NEGATIVE  NEGATIVE Final    POC Ketones, Urine 12/30/2023 NEGATIVE  NEGATIVE mg/dl Final    POC Specific Gravity, Urine 12/30/2023 1.020  1.005 - 1.035 Final    POC Blood, Urine 12/30/2023 MODERATE (2+) (A)  NEGATIVE Final    POC PH, Urine 12/30/2023 6.0  No Reference Range Established PH Final    POC Protein, Urine 12/30/2023 30 (1+)  NEGATIVE, 30 (1+) mg/dl Final    POC Urobilinogen, Urine 12/30/2023 0.2  0.2, 1.0 EU/DL Final    Poc Nitrite, Urine 12/30/2023 NEGATIVE  NEGATIVE Final    POC Leukocytes, Urine 12/30/2023 MODERATE (2+) (A)  NEGATIVE Final    Urine Culture 12/30/2023 20,000 - 80,000 Escherichia coli (A)   Final       Radiology: Reviewed imaging in powerchart.  No results found.      Charting was completed using voice recognition technology and may include unintended errors.

## 2024-04-16 ENCOUNTER — TELEPHONE (OUTPATIENT)
Dept: PRIMARY CARE | Facility: CLINIC | Age: 84
End: 2024-04-16
Payer: MEDICARE

## 2024-04-16 NOTE — TELEPHONE ENCOUNTER
Patient said she feels totally uninformed regarding the medications that were sent to her pharmacy yesterday. She wants to know exactly why spironolactone and prednisone was sent in. That she doesn't take these. As for her b.p she takes losartin.  She is requesting a call back from our office

## 2024-04-29 ENCOUNTER — OFFICE VISIT (OUTPATIENT)
Dept: PRIMARY CARE | Facility: CLINIC | Age: 84
End: 2024-04-29
Payer: MEDICARE

## 2024-04-29 VITALS
HEART RATE: 71 BPM | BODY MASS INDEX: 26.97 KG/M2 | HEIGHT: 66 IN | DIASTOLIC BLOOD PRESSURE: 61 MMHG | SYSTOLIC BLOOD PRESSURE: 141 MMHG | OXYGEN SATURATION: 96 % | WEIGHT: 167.8 LBS | TEMPERATURE: 97.2 F

## 2024-04-29 DIAGNOSIS — R73.9 HYPERGLYCEMIA: ICD-10-CM

## 2024-04-29 DIAGNOSIS — T07.XXXA MULTIPLE BRUISES: ICD-10-CM

## 2024-04-29 DIAGNOSIS — J44.9 COPD MIXED TYPE (MULTI): ICD-10-CM

## 2024-04-29 DIAGNOSIS — I10 BENIGN ESSENTIAL HYPERTENSION: Primary | ICD-10-CM

## 2024-04-29 DIAGNOSIS — E78.2 HYPERLIPEMIA, MIXED: ICD-10-CM

## 2024-04-29 DIAGNOSIS — K21.9 GERD WITHOUT ESOPHAGITIS: ICD-10-CM

## 2024-04-29 DIAGNOSIS — D50.0 IRON DEFICIENCY ANEMIA DUE TO CHRONIC BLOOD LOSS: ICD-10-CM

## 2024-04-29 PROBLEM — S40.021A CONTUSION OF BOTH UPPER EXTREMITIES: Status: RESOLVED | Noted: 2024-04-15 | Resolved: 2024-04-29

## 2024-04-29 PROBLEM — M79.7 FIBROMYALGIA: Status: RESOLVED | Noted: 2024-04-15 | Resolved: 2024-04-29

## 2024-04-29 PROBLEM — S40.022A CONTUSION OF BOTH UPPER EXTREMITIES: Status: RESOLVED | Noted: 2024-04-15 | Resolved: 2024-04-29

## 2024-04-29 PROCEDURE — 99214 OFFICE O/P EST MOD 30 MIN: CPT | Performed by: INTERNAL MEDICINE

## 2024-04-29 PROCEDURE — 3078F DIAST BP <80 MM HG: CPT | Performed by: INTERNAL MEDICINE

## 2024-04-29 PROCEDURE — 1036F TOBACCO NON-USER: CPT | Performed by: INTERNAL MEDICINE

## 2024-04-29 PROCEDURE — 3077F SYST BP >= 140 MM HG: CPT | Performed by: INTERNAL MEDICINE

## 2024-04-29 PROCEDURE — 1159F MED LIST DOCD IN RCRD: CPT | Performed by: INTERNAL MEDICINE

## 2024-04-29 PROCEDURE — G2211 COMPLEX E/M VISIT ADD ON: HCPCS | Performed by: INTERNAL MEDICINE

## 2024-04-29 PROCEDURE — 1160F RVW MEDS BY RX/DR IN RCRD: CPT | Performed by: INTERNAL MEDICINE

## 2024-04-29 RX ORDER — AMLODIPINE AND BENAZEPRIL HYDROCHLORIDE 5; 10 MG/1; MG/1
1 CAPSULE ORAL DAILY
Qty: 90 CAPSULE | Refills: 1 | Status: SHIPPED | OUTPATIENT
Start: 2024-04-29

## 2024-04-29 NOTE — PROGRESS NOTES
Subjective   Patient ID: Rita Vazquez is a 83 y.o. female who presents for Follow-up (On blood spots on arms, legs and chest /Go over last blood work ) and Fatigue.    Assessment/Plan     Problem List Items Addressed This Visit       Hyperlipemia, mixed     Low-fat diet continue Lipitor 10 mg a day monitor CMP CPK lipid once a year         Benign essential hypertension - Primary     Lotrel 5/10 1 a day check BMP twice a year keep blood pressure less than 120/80         Relevant Medications    amLODIPine-benazepriL (LotreL) 5-10 mg capsule    Other Relevant Orders    CBC and Auto Differential    Hemoglobin A1C    Magnesium    Iron and TIBC    Vitamin B12    GERD without esophagitis     Continue OTC Prilosec and Pepcid monitor CBC H. pylori magnesium once a year         COPD mixed type (Multi)     Pt has moderate to severe COPD. It is progressive disease, use of MDI and proper technique discussed, rinse mouth after inhaled corticosteroids. Notify if progressive dyspnea or cough or lethargy, monitor oxygen saturation if possible with home based pulse oximetry. Avoid hospitalization and call us if any flare ups are about to happen, be sure that annual flu vaccine are uptodate and review need for pneumococcal vaccine. Light to moderate low impact exercises are very helpful and deep breathing  exercises are beneficial in chronic lung diseases.            Multiple bruises     Most likely local the skin problem advised hematology dermatology evaluation         Relevant Orders    CBC and Auto Differential    Hemoglobin A1C    Magnesium    Iron and TIBC    Vitamin B12    Iron deficiency anemia due to chronic blood loss    Relevant Orders    CBC and Auto Differential    Hemoglobin A1C    Magnesium    Iron and TIBC    Vitamin B12     Other Visit Diagnoses       Hyperglycemia        Relevant Orders    Hemoglobin A1C          Patient was evaluated today, problem list was reviewed, problems and concerns addressed, Rx list  reviewed and updated, lab and tests were noted and reviewed. Life style changes were discussed, always it works better if we eat plant based diet and plenty of fibres and roughage. Consume adequate amount of water and avoid alcohol, light to moderate physical activities and stress reduction are always beneficial for ongoing physical well being. Do not forget to have 6 to 7 hours of sleep regularly and avoid late night julio cesar screen exposure.    HPI  This is a 83-year-old patient who had history of hypertension hyperlipidemia proteinuria complaining of uncontrolled hypertension without any complication reviewed the chart had a increased BMI and systolic hypertension    Negative for headache chest pain hematuria or fall    Patient has multiple bruising of both upper lower extremity that is in the ventral surface only in my clinical judgment look like local skin autoimmune problem see very about any lymphoma leukemia advised to see dermatology him at oncology autoimmune workup was ordered check the CBC BMP sed rate [facility] PT PTT.    Negative personal family history of lymphoma leukemia    Negative for weight loss or night sweats.  Past Medical History:   Diagnosis Date    Anemia     GERD (gastroesophageal reflux disease)     Hypertension     Personal history of other diseases of the musculoskeletal system and connective tissue 09/28/2022    History of arthritis    Personal history of other diseases of the respiratory system 09/28/2022    History of sinus problem    Personal history of other infectious and parasitic diseases     History of herpes genitalis    Urinary tract infection, site not specified 11/21/2021    Acute UTI     Past Surgical History:   Procedure Laterality Date    BACK SURGERY      JOINT REPLACEMENT      KNEE      OTHER SURGICAL HISTORY  09/28/2022    Knee replacement    OTHER SURGICAL HISTORY  09/28/2022    Back surgery    TONSILLECTOMY      WISDOM TOOTH EXTRACTION       Allergies   Allergen Reactions     Amoxicillin Other    Metronidazole Other     Current Outpatient Medications   Medication Sig Dispense Refill    amLODIPine-benazepriL (LotreL) 5-10 mg capsule Take 1 capsule by mouth once daily. 90 capsule 1    atorvastatin (Lipitor) 10 mg tablet Take 1 tablet (10 mg) by mouth once daily. 90 tablet 3    budesonide-formoteroL (Symbicort) 80-4.5 mcg/actuation inhaler Inhale 2 puffs twice a day 10.2 each 3    calcium carbonate/vitamin D3 (CALTRATE WITH VITAMIN D3 ORAL) Take by mouth.      cholecalciferol (Vitamin D3) 5,000 Units tablet Take by mouth once daily.      famotidine (Pepcid) 40 mg tablet Take 1 tablet (40 mg) by mouth 2 times a day.      ipratropium-albuteroL (Duo-Neb) 0.5-2.5 mg/3 mL nebulizer solution inhale the contents of 1 vial via nebulizer every 12 hours      multivitamin tablet Take 1 tablet by mouth once daily.      valACYclovir (Valtrex) 500 mg tablet Take 1 tablet (500 mg) by mouth once daily.       No current facility-administered medications for this visit.     Family History   Problem Relation Name Age of Onset    Parkinsonism Mother      Prostate cancer Father Kirk     Cancer Father Kirk      Social History     Socioeconomic History    Marital status:      Spouse name: None    Number of children: None    Years of education: None    Highest education level: None   Occupational History    None   Tobacco Use    Smoking status: Former     Current packs/day: 0.50     Average packs/day: 0.5 packs/day for 30.0 years (15.0 ttl pk-yrs)     Types: Cigarettes    Smokeless tobacco: Never   Substance and Sexual Activity    Alcohol use: Never    Drug use: Never    Sexual activity: Not Currently     Partners: Male   Other Topics Concern    None   Social History Narrative    None     Social Determinants of Health     Financial Resource Strain: Not on file   Food Insecurity: Not on file   Transportation Needs: Not on file   Physical Activity: Not on file   Stress: Not on file   Social Connections:  "Not on file   Intimate Partner Violence: Not on file   Housing Stability: Not on file     Immunization History   Administered Date(s) Administered    Flu vaccine (IIV4), preservative free *Check age/dose* 11/09/2011, 10/12/2015, 10/25/2016, 10/30/2017, 10/11/2018, 10/01/2020, 10/06/2021, 10/07/2022    Flu vaccine, quadrivalent, high-dose, preservative free, age 65y+ (FLUZONE) 10/23/2023    Flu vaccine, quadrivalent, no egg protein, age 6 month or greater (FLUCELVAX) 10/30/2017    Influenza, High Dose Seasonal, Preservative Free 10/25/2016, 10/11/2018    Influenza, Seasonal, Quadrivalent, Adjuvanted 10/01/2020, 10/06/2021, 10/07/2022    Influenza, seasonal, injectable 11/09/2011    Influenza, seasonal, injectable, preservative free 10/12/2015    Moderna COVID-19 vaccine, Fall 2023, 12 yeasrs and older (50mcg/0.5mL) 10/09/2023    Moderna COVID-19 vaccine, bivalent, blue cap/gray label *Check age/dose* 11/21/2022    Moderna SARS-CoV-2 Vaccination 01/07/2021, 02/03/2021, 10/29/2021, 06/10/2022    Pneumococcal conjugate vaccine, 13-valent (PREVNAR 13) 10/12/2015    Pneumococcal conjugate vaccine, 20-valent (PREVNAR 20) 06/30/2023    Pneumococcal polysaccharide vaccine, 23-valent, age 2 years and older (PNEUMOVAX 23) 11/02/2020    RESPIRATORY SYNCYTIAL VIRUS (RSV), ELIGIBLE PREGNANT PTS, 0.5 ML (ABRYSVO) 12/20/2023    Tdap vaccine, age 7 year and older (BOOSTRIX, ADACEL) 06/30/2023    Zoster vaccine, recombinant, adult (SHINGRIX) 09/24/2019, 09/27/2019, 12/03/2019    Zoster, Unspecified 09/27/2019, 12/03/2019    Zoster, live 01/10/2017       Review of Systems  Review of systems is otherwise negative unless stated above or in history of present illness.    Objective   Visit Vitals  /61 (BP Location: Left arm, Patient Position: Sitting, BP Cuff Size: Adult)   Pulse 71   Temp 36.2 °C (97.2 °F)   Ht 1.676 m (5' 6\")   Wt 76.1 kg (167 lb 12.8 oz)   SpO2 96%   BMI 27.08 kg/m²   Smoking Status Former   BSA 1.88 m² "     Physical Exam  Constitutional:       General: not in acute distress.   HENT:      Head: Normocephalic and atraumatic.      Nose: Nose normal.   Eyes:      Extraocular Movements: Extraocular movements intact.      Conjunctiva/sclera: Conjunctivae normal.   Cardiovascular:  systolic heart murmur mitral     Rate and Rhythm: Normal rate ,  No M/R/G  Pulmonary:      Effort: Pulmonary effort is normal.      Breath sounds: Normal, Bilat Equal AE  Skin: Multiple bruising upper lower extremity nonblanching     General: Skin is warm.   Neurological:      Mental Status: He is alert and oriented to person, place, and time.   Psychiatric:         Mood and Affect: Mood normal.         Behavior: Behavior normal.   Musculoskeletal   Arthritis of hip and spine    Office Visit on 12/30/2023   Component Date Value Ref Range Status    POC Color, Urine 12/30/2023 Yellow  Straw, Yellow, Light-Yellow Final    POC Appearance, Urine 12/30/2023 Cloudy (A)  Clear Final    POC Glucose, Urine 12/30/2023 NEGATIVE  NEGATIVE mg/dl Final    POC Bilirubin, Urine 12/30/2023 NEGATIVE  NEGATIVE Final    POC Ketones, Urine 12/30/2023 NEGATIVE  NEGATIVE mg/dl Final    POC Specific Gravity, Urine 12/30/2023 1.020  1.005 - 1.035 Final    POC Blood, Urine 12/30/2023 MODERATE (2+) (A)  NEGATIVE Final    POC PH, Urine 12/30/2023 6.0  No Reference Range Established PH Final    POC Protein, Urine 12/30/2023 30 (1+)  NEGATIVE, 30 (1+) mg/dl Final    POC Urobilinogen, Urine 12/30/2023 0.2  0.2, 1.0 EU/DL Final    Poc Nitrite, Urine 12/30/2023 NEGATIVE  NEGATIVE Final    POC Leukocytes, Urine 12/30/2023 MODERATE (2+) (A)  NEGATIVE Final    Urine Culture 12/30/2023 20,000 - 80,000 Escherichia coli (A)   Final       Radiology: Reviewed imaging in powerchart.  No results found.      Charting was completed using voice recognition technology and may include unintended errors.

## 2024-04-29 NOTE — ASSESSMENT & PLAN NOTE
Pt has moderate to severe COPD. It is progressive disease, use of MDI and proper technique discussed, rinse mouth after inhaled corticosteroids. Notify if progressive dyspnea or cough or lethargy, monitor oxygen saturation if possible with home based pulse oximetry. Avoid hospitalization and call us if any flare ups are about to happen, be sure that annual flu vaccine are uptodate and review need for pneumococcal vaccine. Light to moderate low impact exercises are very helpful and deep breathing  exercises are beneficial in chronic lung diseases.

## 2024-05-01 ENCOUNTER — TELEPHONE (OUTPATIENT)
Dept: PRIMARY CARE | Facility: CLINIC | Age: 84
End: 2024-05-01
Payer: MEDICARE

## 2024-05-01 DIAGNOSIS — I10 BENIGN ESSENTIAL HYPERTENSION: ICD-10-CM

## 2024-05-01 NOTE — TELEPHONE ENCOUNTER
PATIENT CALLED IN TODAY AND STATED THAT SHE SPOKE WITH [facility] YESTERDAY ABOUT B.W RESULTS BUT NOT ALL HER TESTS WERE DISCUSSED DURING THE CONVERSATION AND SHE WANTS TO KNOW WHY AND WANTS A CALL BACK AND WANTS THINGS EXPLAINED SO SHE CAN UNDERSTAND THEM.    PATIENT IS AWARE THAT BOTH  AND [facility] ARE OUT OF OFFICE TODAY AND SHE WILL NOT HEAR BACK FROM US TODAY

## 2024-05-13 ENCOUNTER — TELEPHONE (OUTPATIENT)
Dept: PRIMARY CARE | Facility: CLINIC | Age: 84
End: 2024-05-13
Payer: MEDICARE

## 2024-05-13 NOTE — TELEPHONE ENCOUNTER
Blood pressure medication switched from losartan to amlodipine-benazepril. She took new med for 11 days, it caused severe fatigue pt stated she could hardly function. She stopped taking it Friday and restarted losartan. She is waning to know what is recommended.

## 2024-07-15 ENCOUNTER — APPOINTMENT (OUTPATIENT)
Dept: PRIMARY CARE | Facility: CLINIC | Age: 84
End: 2024-07-15
Payer: MEDICARE

## 2024-07-15 VITALS
BODY MASS INDEX: 28.28 KG/M2 | SYSTOLIC BLOOD PRESSURE: 144 MMHG | HEIGHT: 66 IN | DIASTOLIC BLOOD PRESSURE: 84 MMHG | HEART RATE: 64 BPM | WEIGHT: 176 LBS | OXYGEN SATURATION: 95 %

## 2024-07-15 DIAGNOSIS — J44.9 COPD MIXED TYPE (MULTI): ICD-10-CM

## 2024-07-15 DIAGNOSIS — K21.9 GERD WITHOUT ESOPHAGITIS: ICD-10-CM

## 2024-07-15 DIAGNOSIS — E78.2 HYPERLIPEMIA, MIXED: ICD-10-CM

## 2024-07-15 DIAGNOSIS — I10 BENIGN ESSENTIAL HYPERTENSION: Primary | ICD-10-CM

## 2024-07-15 PROBLEM — Z00.00 MEDICARE ANNUAL WELLNESS VISIT, SUBSEQUENT: Status: RESOLVED | Noted: 2023-10-03 | Resolved: 2024-07-15

## 2024-07-15 PROBLEM — D50.0 IRON DEFICIENCY ANEMIA DUE TO CHRONIC BLOOD LOSS: Status: RESOLVED | Noted: 2024-04-29 | Resolved: 2024-07-15

## 2024-07-15 PROBLEM — T07.XXXA MULTIPLE BRUISES: Status: RESOLVED | Noted: 2024-04-15 | Resolved: 2024-07-15

## 2024-07-15 PROBLEM — L29.9 PRURITUS: Status: RESOLVED | Noted: 2024-04-15 | Resolved: 2024-07-15

## 2024-07-15 PROCEDURE — 99214 OFFICE O/P EST MOD 30 MIN: CPT | Performed by: INTERNAL MEDICINE

## 2024-07-15 PROCEDURE — 1160F RVW MEDS BY RX/DR IN RCRD: CPT | Performed by: INTERNAL MEDICINE

## 2024-07-15 PROCEDURE — 1159F MED LIST DOCD IN RCRD: CPT | Performed by: INTERNAL MEDICINE

## 2024-07-15 PROCEDURE — 3079F DIAST BP 80-89 MM HG: CPT | Performed by: INTERNAL MEDICINE

## 2024-07-15 PROCEDURE — 3077F SYST BP >= 140 MM HG: CPT | Performed by: INTERNAL MEDICINE

## 2024-07-15 PROCEDURE — G2211 COMPLEX E/M VISIT ADD ON: HCPCS | Performed by: INTERNAL MEDICINE

## 2024-07-15 PROCEDURE — 1036F TOBACCO NON-USER: CPT | Performed by: INTERNAL MEDICINE

## 2024-07-15 RX ORDER — AMLODIPINE BESYLATE 2.5 MG/1
2.5 TABLET ORAL 2 TIMES DAILY
Qty: 60 TABLET | Refills: 3 | Status: SHIPPED | OUTPATIENT
Start: 2024-07-15 | End: 2024-11-12

## 2024-07-15 RX ORDER — LOSARTAN POTASSIUM 50 MG/1
50 TABLET ORAL DAILY
COMMUNITY
Start: 2024-06-06 | End: 2024-07-18 | Stop reason: SDUPTHER

## 2024-07-15 NOTE — PROGRESS NOTES
Subjective   Patient ID: Rita Vazquez is a 83 y.o. female who presents for Follow-up (Discuss blood pressure. ).    Assessment/Plan     Problem List Items Addressed This Visit       Hyperlipemia, mixed     Monitor CMP CPK lipid twice a year         Relevant Orders    Albumin-Creatinine Ratio, Urine Random    Comprehensive Metabolic Panel    Benign essential hypertension - Primary     Losartan 50 mg twice a day amlodipine 2.5 mg twice a day monitor CMP twice a year         Relevant Orders    Albumin-Creatinine Ratio, Urine Random    Comprehensive Metabolic Panel    GERD without esophagitis     Monitor H. pylori magnesium once a year         COPD mixed type (Multi)     Pulse ox 96 get the spirometry twice a year        Patient was evaluated today, problem list was reviewed, problems and concerns addressed, Rx list reviewed and updated, lab and tests were noted and reviewed. Life style changes were discussed, always it works better if we eat plant based diet and plenty of fibres and roughage. Consume adequate amount of water and avoid alcohol, light to moderate physical activities and stress reduction are always beneficial for ongoing physical well being. Do not forget to have 6 to 7 hours of sleep regularly and avoid late night julio cesar screen exposure.      HPI 83-year-old patient have hypertension hyperlipidemia gastritis Cologuard positive advised GI evaluation Dr. Villagran repeat CBC BMP EGD colonoscopy    Uncontrolled hypertension without any complication    Cough congestion arthralgia myalgia fatigue tired weakness    Negative for headache hypoxia or fall    Negative for jaundice hematuria or rectal bleeding or pruritus    Add on amlodipine 2.5 mg twice a day watch for edema  Past Medical History:   Diagnosis Date    Anemia     GERD (gastroesophageal reflux disease)     Hypertension     Personal history of other diseases of the musculoskeletal system and connective tissue 09/28/2022    History of arthritis     Personal history of other diseases of the respiratory system 09/28/2022    History of sinus problem    Personal history of other infectious and parasitic diseases     History of herpes genitalis    Urinary tract infection, site not specified 11/21/2021    Acute UTI     Past Surgical History:   Procedure Laterality Date    BACK SURGERY      JOINT REPLACEMENT      KNEE      OTHER SURGICAL HISTORY  09/28/2022    Knee replacement    OTHER SURGICAL HISTORY  09/28/2022    Back surgery    TONSILLECTOMY      WISDOM TOOTH EXTRACTION       Allergies   Allergen Reactions    Amoxicillin Other    Metronidazole Other     Current Outpatient Medications   Medication Sig Dispense Refill    atorvastatin (Lipitor) 10 mg tablet Take 1 tablet (10 mg) by mouth once daily. 90 tablet 3    budesonide-formoteroL (Symbicort) 80-4.5 mcg/actuation inhaler Inhale 2 puffs twice a day 10.2 each 3    calcium carbonate/vitamin D3 (CALTRATE WITH VITAMIN D3 ORAL) Take by mouth.      cholecalciferol (Vitamin D3) 5,000 Units tablet Take by mouth once daily.      ELDERBERRY FRUIT ORAL Take by mouth 2 times a day.      famotidine (Pepcid) 40 mg tablet Take 1 tablet (40 mg) by mouth 2 times a day.      ipratropium-albuteroL (Duo-Neb) 0.5-2.5 mg/3 mL nebulizer solution inhale the contents of 1 vial via nebulizer every 12 hours      losartan (Cozaar) 50 mg tablet Take 1 tablet (50 mg) by mouth once daily.      multivitamin tablet Take 1 tablet by mouth once daily.      valACYclovir (Valtrex) 500 mg tablet Take 1 tablet (500 mg) by mouth once daily.       No current facility-administered medications for this visit.     Family History   Problem Relation Name Age of Onset    Parkinsonism Mother      Prostate cancer Father Kirk     Cancer Father Kirk      Social History     Socioeconomic History    Marital status:    Tobacco Use    Smoking status: Former     Current packs/day: 0.50     Average packs/day: 0.5 packs/day for 30.0 years (15.0 ttl pk-yrs)  "    Types: Cigarettes    Smokeless tobacco: Never   Substance and Sexual Activity    Alcohol use: Never    Drug use: Never    Sexual activity: Not Currently     Partners: Male     Immunization History   Administered Date(s) Administered    Flu vaccine (IIV4), preservative free *Check age/dose* 11/09/2011, 10/12/2015, 10/25/2016, 10/30/2017, 10/11/2018, 10/01/2020, 10/06/2021, 10/07/2022    Flu vaccine, quadrivalent, high-dose, preservative free, age 65y+ (FLUZONE) 10/23/2023    Flu vaccine, quadrivalent, no egg protein, age 6 month or greater (FLUCELVAX) 10/30/2017    Influenza, High Dose Seasonal, Preservative Free 10/25/2016, 10/11/2018    Influenza, Seasonal, Quadrivalent, Adjuvanted 10/01/2020, 10/06/2021, 10/07/2022    Influenza, seasonal, injectable 11/09/2011    Influenza, seasonal, injectable, preservative free 10/12/2015    Moderna COVID-19 vaccine, Fall 2023, 12 yeasrs and older (50mcg/0.5mL) 10/09/2023    Moderna COVID-19 vaccine, bivalent, blue cap/gray label *Check age/dose* 11/21/2022    Moderna SARS-CoV-2 Vaccination 01/07/2021, 02/03/2021, 10/29/2021, 06/10/2022    Pneumococcal conjugate vaccine, 13-valent (PREVNAR 13) 10/12/2015    Pneumococcal conjugate vaccine, 20-valent (PREVNAR 20) 06/30/2023    Pneumococcal polysaccharide vaccine, 23-valent, age 2 years and older (PNEUMOVAX 23) 11/02/2020    RESPIRATORY SYNCYTIAL VIRUS (RSV), ELIGIBLE PREGNANT PTS, 0.5 ML (ABRYSVO) 12/20/2023    Tdap vaccine, age 7 year and older (BOOSTRIX, ADACEL) 06/30/2023    Zoster vaccine, recombinant, adult (SHINGRIX) 09/24/2019, 09/27/2019, 12/03/2019    Zoster, Unspecified 09/27/2019, 12/03/2019    Zoster, live 01/10/2017       Review of Systems  Review of systems is otherwise negative unless stated above or in history of present illness.    Objective   Visit Vitals  /84   Pulse 64   Ht 1.676 m (5' 6\")   Wt 79.8 kg (176 lb)   SpO2 95%   BMI 28.41 kg/m²   Smoking Status Former   BSA 1.93 m²     Physical " Exam  Constitutional: BMI 28     General: not in acute distress.   HENT:      Head: Normocephalic and atraumatic.      Nose: Nose normal.   Eyes: No jaundice     Extraocular Movements: Extraocular movements intact.      Conjunctiva/sclera: Conjunctivae normal.   Cardiovascular: S4     Rate and Rhythm: Normal rate ,  No M/R/G  Pulmonary: Crackles rhonchi     Effort: Pulmonary effort is normal.      Breath sounds: Normal, Bilat Equal AE  Skin:     General: Skin is warm.   Neurological:      Mental Status: He is alert and oriented to person, place, and time.   Psychiatric: Anxiety without depression   Mood and Affect: Mood normal.         Behavior: Behavior normal.   Musculoskeletal   FROM in all extremitirs,  Joint-no swelling or tenderness    No visits with results within 4 Month(s) from this visit.   Latest known visit with results is:   Office Visit on 12/30/2023   Component Date Value Ref Range Status    POC Color, Urine 12/30/2023 Yellow  Straw, Yellow, Light-Yellow Final    POC Appearance, Urine 12/30/2023 Cloudy (A)  Clear Final    POC Glucose, Urine 12/30/2023 NEGATIVE  NEGATIVE mg/dl Final    POC Bilirubin, Urine 12/30/2023 NEGATIVE  NEGATIVE Final    POC Ketones, Urine 12/30/2023 NEGATIVE  NEGATIVE mg/dl Final    POC Specific Gravity, Urine 12/30/2023 1.020  1.005 - 1.035 Final    POC Blood, Urine 12/30/2023 MODERATE (2+) (A)  NEGATIVE Final    POC PH, Urine 12/30/2023 6.0  No Reference Range Established PH Final    POC Protein, Urine 12/30/2023 30 (1+)  NEGATIVE, 30 (1+) mg/dl Final    POC Urobilinogen, Urine 12/30/2023 0.2  0.2, 1.0 EU/DL Final    Poc Nitrite, Urine 12/30/2023 NEGATIVE  NEGATIVE Final    POC Leukocytes, Urine 12/30/2023 MODERATE (2+) (A)  NEGATIVE Final    Urine Culture 12/30/2023 20,000 - 80,000 Escherichia coli (A)   Final       Radiology: Reviewed imaging in powerchart.  No results found.      Charting was completed using voice recognition technology and may include unintended  errors.

## 2024-07-16 ENCOUNTER — TELEPHONE (OUTPATIENT)
Dept: PRIMARY CARE | Facility: CLINIC | Age: 84
End: 2024-07-16
Payer: MEDICARE

## 2024-07-16 DIAGNOSIS — I10 BENIGN ESSENTIAL HYPERTENSION: ICD-10-CM

## 2024-07-18 RX ORDER — LOSARTAN POTASSIUM 50 MG/1
50 TABLET ORAL 2 TIMES DAILY
Qty: 180 TABLET | Refills: 3 | Status: SHIPPED | OUTPATIENT
Start: 2024-07-18

## 2024-07-24 ENCOUNTER — TELEPHONE (OUTPATIENT)
Dept: PRIMARY CARE | Facility: CLINIC | Age: 84
End: 2024-07-24
Payer: MEDICARE

## 2024-07-24 NOTE — TELEPHONE ENCOUNTER
PT is asking for a counselor for herself.  She wants to know if the Dr can refer her to someone.      Her complaint is not being able to communicate with her adult daughter whom lives in another [place] and she only writes to her once a year.

## 2024-08-05 DIAGNOSIS — Z00.00 HEALTH CARE MAINTENANCE: ICD-10-CM

## 2024-08-05 RX ORDER — VALACYCLOVIR HYDROCHLORIDE 500 MG/1
500 TABLET, FILM COATED ORAL DAILY
Qty: 90 TABLET | Refills: 1 | Status: SHIPPED | OUTPATIENT
Start: 2024-08-05

## 2024-08-14 ENCOUNTER — TELEPHONE (OUTPATIENT)
Dept: PRIMARY CARE | Facility: CLINIC | Age: 84
End: 2024-08-14
Payer: MEDICARE

## 2024-08-14 NOTE — TELEPHONE ENCOUNTER
Pt called stating they were returning a call from our office, but I do not see any notes in her chart. Pt will be in the office 8/15 and stated she went to [facility] 8/12 and [facility]

## 2024-08-15 ENCOUNTER — APPOINTMENT (OUTPATIENT)
Dept: PRIMARY CARE | Facility: CLINIC | Age: 84
End: 2024-08-15
Payer: MEDICARE

## 2024-08-15 VITALS
BODY MASS INDEX: 27.74 KG/M2 | SYSTOLIC BLOOD PRESSURE: 129 MMHG | WEIGHT: 172.6 LBS | OXYGEN SATURATION: 96 % | HEART RATE: 57 BPM | HEIGHT: 66 IN | DIASTOLIC BLOOD PRESSURE: 60 MMHG

## 2024-08-15 DIAGNOSIS — K21.9 GERD WITHOUT ESOPHAGITIS: ICD-10-CM

## 2024-08-15 DIAGNOSIS — I10 BENIGN ESSENTIAL HYPERTENSION: ICD-10-CM

## 2024-08-15 DIAGNOSIS — J44.9 COPD MIXED TYPE (MULTI): ICD-10-CM

## 2024-08-15 DIAGNOSIS — R73.9 HYPERGLYCEMIA: Primary | ICD-10-CM

## 2024-08-15 DIAGNOSIS — E78.2 HYPERLIPEMIA, MIXED: ICD-10-CM

## 2024-08-15 PROCEDURE — 3078F DIAST BP <80 MM HG: CPT | Performed by: INTERNAL MEDICINE

## 2024-08-15 PROCEDURE — 1036F TOBACCO NON-USER: CPT | Performed by: INTERNAL MEDICINE

## 2024-08-15 PROCEDURE — G2211 COMPLEX E/M VISIT ADD ON: HCPCS | Performed by: INTERNAL MEDICINE

## 2024-08-15 PROCEDURE — 3074F SYST BP LT 130 MM HG: CPT | Performed by: INTERNAL MEDICINE

## 2024-08-15 PROCEDURE — 99214 OFFICE O/P EST MOD 30 MIN: CPT | Performed by: INTERNAL MEDICINE

## 2024-08-15 PROCEDURE — 1160F RVW MEDS BY RX/DR IN RCRD: CPT | Performed by: INTERNAL MEDICINE

## 2024-08-15 PROCEDURE — 1159F MED LIST DOCD IN RCRD: CPT | Performed by: INTERNAL MEDICINE

## 2024-08-15 NOTE — PROGRESS NOTES
Subjective   Patient ID: Rita Vazquez is a 84 y.o. female who presents for Follow-up (Go over blood work ).    Assessment/Plan     Problem List Items Addressed This Visit       Hyperlipemia, mixed    Relevant Orders    Comprehensive Metabolic Panel    Hemoglobin A1C    Lipid Panel    Hyperglycemia - Primary     Low-carb diet check hemoglobin A1c         Relevant Orders    Hemoglobin A1C    Benign essential hypertension     Patients BP readings reviewed and addressed, as we age our arteries turn stiffer and less elastic. Restricting salt consumption and staying physically fit with regular exercise regimen is the only way to keep our vasculature less tonic. Studies have shown that keeping ideal body wt, exercise routine about 140 to 150 minutes a week, eating variety of plant based diet and drinking plentiful water are quite helpful. Monitor BP twice or once a week at home and bring log to be reviewed by me. Uncontrolled BP has long term consequences including heart failure, myocardial infarction, accelerated atherosclerosis and kidney dysfunction. Therapy reviewed and explained.           Relevant Orders    Comprehensive Metabolic Panel    Hemoglobin A1C    Lipid Panel    GERD without esophagitis    COPD mixed type (Multi)     PFT once a year advised to get flu pneumonia COVID-19 vaccine continue Symbicort          Other Visit Diagnoses       Adult BMI 27.0-27.9 kg/sq m            Hyperglycemia glucose 1/9/2022    Creatinine 0.5    BUN 30    HPI 89-year-old patient who had history of hypertension hyperlipidemia COPD gastritis abnormal labs reviewed with the information with the patient    Advised cut down carbohydrate increase protein    Hypertension amlodipine    Hyperlipidemia Lipitor    Gastritis Pepcid    Proteinuria Cozaar    Hyperglycemia low-carb diet    At age of 84 female skin cancer breast cancer colon cancer screening osteoporosis screening flu pneumonia COVID-19 vaccines follow-up 3 months  Past  Medical History:   Diagnosis Date    Anemia     GERD (gastroesophageal reflux disease)     Hypertension     Personal history of other diseases of the musculoskeletal system and connective tissue 09/28/2022    History of arthritis    Personal history of other diseases of the respiratory system 09/28/2022    History of sinus problem    Personal history of other infectious and parasitic diseases     History of herpes genitalis    Urinary tract infection, site not specified 11/21/2021    Acute UTI     Past Surgical History:   Procedure Laterality Date    BACK SURGERY      JOINT REPLACEMENT      KNEE      OTHER SURGICAL HISTORY  09/28/2022    Knee replacement    OTHER SURGICAL HISTORY  09/28/2022    Back surgery    TONSILLECTOMY      WISDOM TOOTH EXTRACTION       Allergies   Allergen Reactions    Amoxicillin Other    Metronidazole Other     Current Outpatient Medications   Medication Sig Dispense Refill    amLODIPine (Norvasc) 2.5 mg tablet Take 1 tablet (2.5 mg) by mouth 2 times a day. 60 tablet 3    atorvastatin (Lipitor) 10 mg tablet Take 1 tablet (10 mg) by mouth once daily. 90 tablet 3    budesonide-formoteroL (Symbicort) 80-4.5 mcg/actuation inhaler Inhale 2 puffs twice a day 10.2 each 3    calcium carbonate/vitamin D3 (CALTRATE WITH VITAMIN D3 ORAL) Take by mouth.      cholecalciferol (Vitamin D3) 5,000 Units tablet Take by mouth once daily.      famotidine (Pepcid) 40 mg tablet Take 1 tablet (40 mg) by mouth 2 times a day.      ipratropium-albuteroL (Duo-Neb) 0.5-2.5 mg/3 mL nebulizer solution inhale the contents of 1 vial via nebulizer every 12 hours      losartan (Cozaar) 50 mg tablet Take 1 tablet (50 mg) by mouth 2 times a day. 180 tablet 3    multivitamin tablet Take 1 tablet by mouth once daily.      valACYclovir (Valtrex) 500 mg tablet Take 1 tablet (500 mg) by mouth once daily. 90 tablet 1     No current facility-administered medications for this visit.     Family History   Problem Relation Name Age of  Onset    Parkinsonism Mother      Prostate cancer Father Kirk     Cancer Father Kirk      Social History     Socioeconomic History    Marital status:    Tobacco Use    Smoking status: Former     Current packs/day: 0.50     Average packs/day: 0.5 packs/day for 30.0 years (15.0 ttl pk-yrs)     Types: Cigarettes    Smokeless tobacco: Never   Substance and Sexual Activity    Alcohol use: Never    Drug use: Never    Sexual activity: Not Currently     Partners: Male     Immunization History   Administered Date(s) Administered    Flu vaccine (IIV4), preservative free *Check age/dose* 11/09/2011, 10/12/2015, 10/25/2016, 10/30/2017, 10/11/2018, 10/01/2020, 10/06/2021, 10/07/2022, 10/23/2023    Flu vaccine, quadrivalent, high-dose, preservative free, age 65y+ (FLUZONE) 10/23/2023    Flu vaccine, quadrivalent, no egg protein, age 6 month or greater (FLUCELVAX) 10/30/2017    Flu vaccine, trivalent, preservative free, HIGH-DOSE, age 65y+ (Fluzone) 10/25/2016, 10/11/2018    Flu vaccine, trivalent, preservative free, age 6 months and greater (Fluarix/Fluzone/Flulaval) 10/12/2015    Influenza, Seasonal, Quadrivalent, Adjuvanted 10/01/2020, 10/06/2021, 10/07/2022    Influenza, seasonal, injectable 11/09/2011    Moderna COVID-19 vaccine, Fall 2023, 12 yeasrs and older (50mcg/0.5mL) 10/09/2023    Moderna COVID-19 vaccine, bivalent, blue cap/gray label *Check age/dose* 11/21/2022    Moderna SARS-CoV-2 Vaccination 01/07/2021, 02/03/2021, 10/29/2021, 06/10/2022    Pneumococcal conjugate vaccine, 13-valent (PREVNAR 13) 10/12/2015    Pneumococcal conjugate vaccine, 20-valent (PREVNAR 20) 06/30/2023    Pneumococcal polysaccharide vaccine, 23-valent, age 2 years and older (PNEUMOVAX 23) 11/02/2020    RESPIRATORY SYNCYTIAL VIRUS (RSV), ELIGIBLE PREGNANT PTS, 0.5 ML (ABRYSVO) 12/20/2023    Tdap vaccine, age 7 year and older (BOOSTRIX, ADACEL) 06/30/2023    Zoster vaccine, recombinant, adult (SHINGRIX) 09/24/2019, 09/27/2019,  "12/03/2019    Zoster, Unspecified 09/27/2019, 12/03/2019    Zoster, live 01/10/2017       Review of Systems  Review of systems is otherwise negative unless stated above or in history of present illness.    Objective   Visit Vitals  /60   Pulse 57   Ht 1.676 m (5' 6\")   Wt 78.3 kg (172 lb 9.6 oz)   SpO2 96%   BMI 27.86 kg/m²   Smoking Status Former   BSA 1.91 m²     Physical Exam  Constitutional:       General: not in acute distress.   HENT:      Head: Normocephalic and atraumatic.      Nose: Nose normal.   Eyes:      Extraocular Movements: Extraocular movements intact.      Conjunctiva/sclera: Conjunctivae normal.   Cardiovascular: Heart murmur     Rate and Rhythm: Normal rate ,  No M/R/G  Pulmonary: Crackle     Effort: Pulmonary effort is normal.      Breath sounds: Normal, Bilat Equal AE  Skin:     General: Skin is warm.   Neurological:      Mental Status: He is alert and oriented to person, place, and time.   Psychiatric:    Anxiety     Mood and Affect: Mood normal.         Behavior: Behavior normal.   Musculoskeletal arthritis  FROM in all extremitirs,  Joint-no swelling or tenderness  History of E. coli treated with antibiotic repeat urinalysis  No visits with results within 4 Month(s) from this visit.   Latest known visit with results is:   Office Visit on 12/30/2023   Component Date Value Ref Range Status    POC Color, Urine 12/30/2023 Yellow  Straw, Yellow, Light-Yellow Final    POC Appearance, Urine 12/30/2023 Cloudy (A)  Clear Final    POC Glucose, Urine 12/30/2023 NEGATIVE  NEGATIVE mg/dl Final    POC Bilirubin, Urine 12/30/2023 NEGATIVE  NEGATIVE Final    POC Ketones, Urine 12/30/2023 NEGATIVE  NEGATIVE mg/dl Final    POC Specific Gravity, Urine 12/30/2023 1.020  1.005 - 1.035 Final    POC Blood, Urine 12/30/2023 MODERATE (2+) (A)  NEGATIVE Final    POC PH, Urine 12/30/2023 6.0  No Reference Range Established PH Final    POC Protein, Urine 12/30/2023 30 (1+)  NEGATIVE, 30 (1+) mg/dl Final    POC " Urobilinogen, Urine 12/30/2023 0.2  0.2, 1.0 EU/DL Final    Poc Nitrite, Urine 12/30/2023 NEGATIVE  NEGATIVE Final    POC Leukocytes, Urine 12/30/2023 MODERATE (2+) (A)  NEGATIVE Final    Urine Culture 12/30/2023 20,000 - 80,000 Escherichia coli (A)   Final       Radiology: Reviewed imaging in powerchart.  No results found.      Charting was completed using voice recognition technology and may include unintended errors.

## 2024-10-11 ENCOUNTER — OFFICE VISIT (OUTPATIENT)
Dept: PRIMARY CARE | Facility: CLINIC | Age: 84
End: 2024-10-11
Payer: MEDICARE

## 2024-10-11 VITALS
OXYGEN SATURATION: 94 % | DIASTOLIC BLOOD PRESSURE: 65 MMHG | HEIGHT: 66 IN | HEART RATE: 80 BPM | SYSTOLIC BLOOD PRESSURE: 107 MMHG | WEIGHT: 168 LBS | BODY MASS INDEX: 27 KG/M2

## 2024-10-11 DIAGNOSIS — A49.9 BACTERIAL INFECTION: Primary | ICD-10-CM

## 2024-10-11 DIAGNOSIS — R05.3 CHRONIC COUGH: ICD-10-CM

## 2024-10-11 DIAGNOSIS — E78.2 HYPERLIPEMIA, MIXED: ICD-10-CM

## 2024-10-11 DIAGNOSIS — J98.4 RESTRICTIVE LUNG DISEASE: ICD-10-CM

## 2024-10-11 DIAGNOSIS — I10 BENIGN ESSENTIAL HYPERTENSION: ICD-10-CM

## 2024-10-11 DIAGNOSIS — R73.9 HYPERGLYCEMIA: ICD-10-CM

## 2024-10-11 PROCEDURE — 3078F DIAST BP <80 MM HG: CPT | Performed by: EMERGENCY MEDICINE

## 2024-10-11 PROCEDURE — 3074F SYST BP LT 130 MM HG: CPT | Performed by: EMERGENCY MEDICINE

## 2024-10-11 PROCEDURE — 1159F MED LIST DOCD IN RCRD: CPT | Performed by: EMERGENCY MEDICINE

## 2024-10-11 PROCEDURE — 1036F TOBACCO NON-USER: CPT | Performed by: EMERGENCY MEDICINE

## 2024-10-11 PROCEDURE — 99214 OFFICE O/P EST MOD 30 MIN: CPT | Performed by: EMERGENCY MEDICINE

## 2024-10-11 RX ORDER — LEVOFLOXACIN 500 MG/1
500 TABLET, FILM COATED ORAL DAILY
Qty: 7 TABLET | Refills: 0 | Status: SHIPPED | OUTPATIENT
Start: 2024-10-11 | End: 2024-10-18

## 2024-10-11 ASSESSMENT — PATIENT HEALTH QUESTIONNAIRE - PHQ9
1. LITTLE INTEREST OR PLEASURE IN DOING THINGS: NOT AT ALL
SUM OF ALL RESPONSES TO PHQ9 QUESTIONS 1 AND 2: 0
2. FEELING DOWN, DEPRESSED OR HOPELESS: NOT AT ALL

## 2024-10-11 ASSESSMENT — ENCOUNTER SYMPTOMS
WHEEZING: 1
COUGH: 1

## 2024-10-11 NOTE — PROGRESS NOTES
Subjective   Patient ID: Rita Vazquez is a 84 y.o. female who presents for Cough (Pt has lung issues ) and Wheezing.    Assessment/Plan     Problem List Items Addressed This Visit    None    Upper respiratory symptoms - Patient presents with cough, sore throat and runny nose. She is currently on benzonatate, she has history of restrictive lung disease. She is on nebulizer and inhaler twice per day for the past year. We will prescribe levaquin and continue to monitor. She follows up with specialist and has a CT scan scheduled.    Hypotension - BP low in office today. Counseled to record at home and if it remains low discontinue BP medication temporarily.    Follow up as necessary      Cough    Wheezing      89-year-old patient who had history of hypertension hyperlipidemia COPD gastritis abnormal labs reviewed with the information with the patient    Advised cut down carbohydrate increase protein    Hypertension amlodipine    Hyperlipidemia Lipitor    Gastritis Pepcid    Proteinuria Cozaar    Hyperglycemia low-carb diet    At age of 84 female skin cancer breast cancer colon cancer screening osteoporosis screening flu pneumonia COVID-19 vaccines follow-up 3 months  Past Medical History:   Diagnosis Date    Anemia     GERD (gastroesophageal reflux disease)     Hypertension     Personal history of other diseases of the musculoskeletal system and connective tissue 09/28/2022    History of arthritis    Personal history of other diseases of the respiratory system 09/28/2022    History of sinus problem    Personal history of other infectious and parasitic diseases     History of herpes genitalis    Urinary tract infection, site not specified 11/21/2021    Acute UTI     Past Surgical History:   Procedure Laterality Date    BACK SURGERY      JOINT REPLACEMENT      KNEE      OTHER SURGICAL HISTORY  09/28/2022    Knee replacement    OTHER SURGICAL HISTORY  09/28/2022    Back surgery    TONSILLECTOMY      WISDOM TOOTH  EXTRACTION       Allergies   Allergen Reactions    Amoxicillin Other    Metronidazole Other     Current Outpatient Medications   Medication Sig Dispense Refill    amLODIPine (Norvasc) 2.5 mg tablet Take 1 tablet (2.5 mg) by mouth 2 times a day. 60 tablet 3    atorvastatin (Lipitor) 10 mg tablet Take 1 tablet (10 mg) by mouth once daily. 90 tablet 3    budesonide-formoteroL (Symbicort) 80-4.5 mcg/actuation inhaler Inhale 2 puffs twice a day 10.2 each 3    calcium carbonate/vitamin D3 (CALTRATE WITH VITAMIN D3 ORAL) Take by mouth.      cholecalciferol (Vitamin D3) 5,000 Units tablet Take by mouth once daily.      famotidine (Pepcid) 40 mg tablet Take 1 tablet (40 mg) by mouth 2 times a day.      ipratropium-albuteroL (Duo-Neb) 0.5-2.5 mg/3 mL nebulizer solution inhale the contents of 1 vial via nebulizer every 12 hours      losartan (Cozaar) 50 mg tablet Take 1 tablet (50 mg) by mouth 2 times a day. 180 tablet 3    multivitamin tablet Take 1 tablet by mouth once daily.      valACYclovir (Valtrex) 500 mg tablet Take 1 tablet (500 mg) by mouth once daily. 90 tablet 1     No current facility-administered medications for this visit.     Family History   Problem Relation Name Age of Onset    Parkinsonism Mother      Prostate cancer Father Kirk     Cancer Father Kirk      Social History     Socioeconomic History    Marital status:    Tobacco Use    Smoking status: Former     Current packs/day: 0.50     Average packs/day: 0.5 packs/day for 30.0 years (15.0 ttl pk-yrs)     Types: Cigarettes    Smokeless tobacco: Never   Substance and Sexual Activity    Alcohol use: Never    Drug use: Never    Sexual activity: Not Currently     Partners: Male     Immunization History   Administered Date(s) Administered    Flu vaccine (IIV4), preservative free *Check age/dose* 11/09/2011, 10/12/2015, 10/25/2016, 10/30/2017, 10/11/2018, 10/01/2020, 10/06/2021, 10/07/2022, 10/23/2023    Flu vaccine, quadrivalent, high-dose, preservative  "free, age 65y+ (FLUZONE) 10/23/2023    Flu vaccine, quadrivalent, no egg protein, age 6 month or greater (FLUCELVAX) 10/30/2017    Flu vaccine, trivalent, preservative free, HIGH-DOSE, age 65y+ (Fluzone) 10/25/2016, 10/11/2018    Flu vaccine, trivalent, preservative free, age 6 months and greater (Fluarix/Fluzone/Flulaval) 10/12/2015    Influenza, Seasonal, Quadrivalent, Adjuvanted 10/01/2020, 10/06/2021, 10/07/2022    Influenza, seasonal, injectable 11/09/2011    Moderna COVID-19 vaccine, 12 years and older (50mcg/0.5mL)(Spikevax) 10/09/2023    Moderna COVID-19 vaccine, bivalent, blue cap/gray label *Check age/dose* 11/21/2022    Moderna SARS-CoV-2 Vaccination 01/07/2021, 02/03/2021, 10/29/2021, 06/10/2022    Pneumococcal conjugate vaccine, 13-valent (PREVNAR 13) 10/12/2015    Pneumococcal conjugate vaccine, 20-valent (PREVNAR 20) 06/30/2023    Pneumococcal polysaccharide vaccine, 23-valent, age 2 years and older (PNEUMOVAX 23) 11/02/2020    RESPIRATORY SYNCYTIAL VIRUS (RSV), ELIGIBLE PREGNANT PTS, 0.5 ML (ABRYSVO) 12/20/2023    Tdap vaccine, age 7 year and older (BOOSTRIX, ADACEL) 06/30/2023    Zoster vaccine, recombinant, adult (SHINGRIX) 09/24/2019, 09/27/2019, 12/03/2019    Zoster, Unspecified 09/27/2019, 12/03/2019    Zoster, live 01/10/2017       Review of Systems  Review of systems is otherwise negative unless stated above or in history of present illness.    Objective   Visit Vitals  /65   Pulse 80   Ht 1.676 m (5' 6\")   Wt 76.2 kg (168 lb)   SpO2 94%   BMI 27.12 kg/m²   Smoking Status Former   BSA 1.88 m²     Physical Exam  Constitutional:       General: not in acute distress.   HENT:      Head: Normocephalic and atraumatic.      Nose: Nose normal.   Eyes:      Extraocular Movements: Extraocular movements intact.      Conjunctiva/sclera: Conjunctivae normal.   Cardiovascular: Heart murmur     Rate and Rhythm: Normal rate ,  No M/R/G  Pulmonary: Crackle     Effort: Pulmonary effort is normal.      " Breath sounds: Normal, Bilat Equal AE  Skin:     General: Skin is warm.   Neurological:      Mental Status: He is alert and oriented to person, place, and time.   Psychiatric:    Anxiety     Mood and Affect: Mood normal.         Behavior: Behavior normal.   Musculoskeletal arthritis  FROM in all extremitirs,  Joint-no swelling or tenderness  History of E. coli treated with antibiotic repeat urinalysis  No visits with results within 4 Month(s) from this visit.   Latest known visit with results is:   Office Visit on 12/30/2023   Component Date Value Ref Range Status    POC Color, Urine 12/30/2023 Yellow  Straw, Yellow, Light-Yellow Final    POC Appearance, Urine 12/30/2023 Cloudy (A)  Clear Final    POC Glucose, Urine 12/30/2023 NEGATIVE  NEGATIVE mg/dl Final    POC Bilirubin, Urine 12/30/2023 NEGATIVE  NEGATIVE Final    POC Ketones, Urine 12/30/2023 NEGATIVE  NEGATIVE mg/dl Final    POC Specific Gravity, Urine 12/30/2023 1.020  1.005 - 1.035 Final    POC Blood, Urine 12/30/2023 MODERATE (2+) (A)  NEGATIVE Final    POC PH, Urine 12/30/2023 6.0  No Reference Range Established PH Final    POC Protein, Urine 12/30/2023 30 (1+)  NEGATIVE, 30 (1+) mg/dl Final    POC Urobilinogen, Urine 12/30/2023 0.2  0.2, 1.0 EU/DL Final    Poc Nitrite, Urine 12/30/2023 NEGATIVE  NEGATIVE Final    POC Leukocytes, Urine 12/30/2023 MODERATE (2+) (A)  NEGATIVE Final    Urine Culture 12/30/2023 20,000 - 80,000 Escherichia coli (A)   Final       Radiology: Reviewed imaging in powerchart.  No results found.      Charting was completed using voice recognition technology and may include unintended errors.

## 2024-11-19 DIAGNOSIS — I10 BENIGN ESSENTIAL HYPERTENSION: ICD-10-CM

## 2024-11-19 RX ORDER — AMLODIPINE BESYLATE 2.5 MG/1
2.5 TABLET ORAL 2 TIMES DAILY
Qty: 180 TABLET | Refills: 3 | Status: SHIPPED | OUTPATIENT
Start: 2024-11-19

## 2024-11-22 ENCOUNTER — APPOINTMENT (OUTPATIENT)
Dept: PRIMARY CARE | Facility: CLINIC | Age: 84
End: 2024-11-22
Payer: MEDICARE

## 2024-11-22 VITALS
DIASTOLIC BLOOD PRESSURE: 62 MMHG | HEIGHT: 66 IN | BODY MASS INDEX: 27.38 KG/M2 | SYSTOLIC BLOOD PRESSURE: 118 MMHG | WEIGHT: 170.4 LBS | OXYGEN SATURATION: 96 % | TEMPERATURE: 96.5 F | HEART RATE: 63 BPM

## 2024-11-22 DIAGNOSIS — K21.9 GERD WITHOUT ESOPHAGITIS: ICD-10-CM

## 2024-11-22 DIAGNOSIS — J44.9 COPD MIXED TYPE (MULTI): ICD-10-CM

## 2024-11-22 DIAGNOSIS — J45.909 ASTHMATIC BRONCHITIS WITHOUT COMPLICATION, UNSPECIFIED ASTHMA SEVERITY, UNSPECIFIED WHETHER PERSISTENT (HHS-HCC): Primary | ICD-10-CM

## 2024-11-22 DIAGNOSIS — E78.2 HYPERLIPEMIA, MIXED: ICD-10-CM

## 2024-11-22 DIAGNOSIS — I10 BENIGN ESSENTIAL HYPERTENSION: ICD-10-CM

## 2024-11-22 PROBLEM — R73.9 HYPERGLYCEMIA: Status: RESOLVED | Noted: 2023-10-03 | Resolved: 2024-11-22

## 2024-11-22 PROCEDURE — 1123F ACP DISCUSS/DSCN MKR DOCD: CPT | Performed by: INTERNAL MEDICINE

## 2024-11-22 PROCEDURE — 96372 THER/PROPH/DIAG INJ SC/IM: CPT | Performed by: INTERNAL MEDICINE

## 2024-11-22 PROCEDURE — 3078F DIAST BP <80 MM HG: CPT | Performed by: INTERNAL MEDICINE

## 2024-11-22 PROCEDURE — 1159F MED LIST DOCD IN RCRD: CPT | Performed by: INTERNAL MEDICINE

## 2024-11-22 PROCEDURE — 3074F SYST BP LT 130 MM HG: CPT | Performed by: INTERNAL MEDICINE

## 2024-11-22 PROCEDURE — 99214 OFFICE O/P EST MOD 30 MIN: CPT | Performed by: INTERNAL MEDICINE

## 2024-11-22 PROCEDURE — 1160F RVW MEDS BY RX/DR IN RCRD: CPT | Performed by: INTERNAL MEDICINE

## 2024-11-22 PROCEDURE — 1036F TOBACCO NON-USER: CPT | Performed by: INTERNAL MEDICINE

## 2024-11-22 RX ORDER — CEFTRIAXONE 1 G/1
1 INJECTION, POWDER, FOR SOLUTION INTRAMUSCULAR; INTRAVENOUS ONCE
Status: COMPLETED | OUTPATIENT
Start: 2024-11-22 | End: 2024-11-22

## 2024-11-22 RX ORDER — MONTELUKAST SODIUM 10 MG/1
10 TABLET ORAL NIGHTLY
Qty: 90 TABLET | Refills: 1 | Status: SHIPPED | OUTPATIENT
Start: 2024-11-22

## 2024-11-22 ASSESSMENT — PATIENT HEALTH QUESTIONNAIRE - PHQ9
2. FEELING DOWN, DEPRESSED OR HOPELESS: NOT AT ALL
10. IF YOU CHECKED OFF ANY PROBLEMS, HOW DIFFICULT HAVE THESE PROBLEMS MADE IT FOR YOU TO DO YOUR WORK, TAKE CARE OF THINGS AT HOME, OR GET ALONG WITH OTHER PEOPLE: NOT DIFFICULT AT ALL
1. LITTLE INTEREST OR PLEASURE IN DOING THINGS: SEVERAL DAYS
SUM OF ALL RESPONSES TO PHQ9 QUESTIONS 1 AND 2: 1

## 2024-11-22 NOTE — PROGRESS NOTES
Subjective   Patient ID: Rita Vazquez is a 84 y.o. female who presents for Follow-up (3 month /Go over blood work ).    Assessment/Plan     Problem List Items Addressed This Visit       Hyperlipemia, mixed     Continue low-fat diet plus Lipitor 10 mg a day monitor CMP CPK lipid twice a year         Benign essential hypertension     Continue Cozaar 50 mg a day monitor BMP twice a year continue amlodipine 2.5 mg a day monitor edema         GERD without esophagitis     Check magnesium and H. pylori once a year continue Pepcid         COPD mixed type (Multi)     PFT given Rocephin singular follow-up with the pulmonary service Dr. Soares         Asthmatic bronchitis without complication ([facility]) - Primary     Patient was evaluated today, problem list was reviewed, problems and concerns addressed, Rx list reviewed and updated, lab and tests were noted and reviewed. Life style changes were discussed, always it works better if we eat plant based diet and plenty of fibres and roughage. Consume adequate amount of water and avoid alcohol, light to moderate physical activities and stress reduction are always beneficial for ongoing physical well being. Do not forget to have 6 to 7 hours of sleep regularly and avoid late night julio cesar screen exposure.    HPI  This is a 89-year-old patient have hypertension hyperlipidemia allergy asthma gastritis osteopenia COVID with chronic cough complaining of the cough congestion arthralgia myalgia fatigue tired weakness    Seen by pulmonary service getting PFT    Continue to have wheezing sensations in the skin irritations    Hypertension amlodipine    Hyperlipidemia Lipitor    COPD Symbicort    Gastritis Pepcid    Proteinuria Cozaar    Acute asthmatic bronchitis given Rocephin singular hydrations referred to pulmonary service    Hemoglobin A1c 6.2 glucose on 913 LDL 85 advise low-fat low-carb diet follow-up in 4 months refer patient to GI and pulmonary service  Past Medical History:    Diagnosis Date    Anemia     GERD (gastroesophageal reflux disease)     Hypertension     Personal history of other diseases of the musculoskeletal system and connective tissue 09/28/2022    History of arthritis    Personal history of other diseases of the respiratory system 09/28/2022    History of sinus problem    Personal history of other infectious and parasitic diseases     History of herpes genitalis    Urinary tract infection, site not specified 11/21/2021    Acute UTI     Past Surgical History:   Procedure Laterality Date    BACK SURGERY      JOINT REPLACEMENT      KNEE      OTHER SURGICAL HISTORY  09/28/2022    Knee replacement    OTHER SURGICAL HISTORY  09/28/2022    Back surgery    TONSILLECTOMY      WISDOM TOOTH EXTRACTION       Allergies   Allergen Reactions    Amoxicillin Other    Metronidazole Other     Current Outpatient Medications   Medication Sig Dispense Refill    amLODIPine (Norvasc) 2.5 mg tablet Take 1 tablet (2.5 mg) by mouth 2 times a day. 180 tablet 3    atorvastatin (Lipitor) 10 mg tablet Take 1 tablet (10 mg) by mouth once daily. 90 tablet 3    budesonide-formoteroL (Symbicort) 80-4.5 mcg/actuation inhaler Inhale 2 puffs twice a day 10.2 each 3    calcium carbonate/vitamin D3 (CALTRATE WITH VITAMIN D3 ORAL) Take by mouth.      cholecalciferol (Vitamin D3) 5,000 Units tablet Take by mouth once daily.      famotidine (Pepcid) 40 mg tablet Take 1 tablet (40 mg) by mouth 2 times a day.      ipratropium-albuteroL (Duo-Neb) 0.5-2.5 mg/3 mL nebulizer solution inhale the contents of 1 vial via nebulizer every 12 hours      losartan (Cozaar) 50 mg tablet Take 1 tablet (50 mg) by mouth 2 times a day. 180 tablet 3    multivitamin tablet Take 1 tablet by mouth once daily.      valACYclovir (Valtrex) 500 mg tablet Take 1 tablet (500 mg) by mouth once daily. 90 tablet 1     No current facility-administered medications for this visit.     Family History   Problem Relation Name Age of Onset     Parkinsonism Mother      Prostate cancer Father Kirk     Cancer Father Kirk      Social History     Socioeconomic History    Marital status:    Tobacco Use    Smoking status: Former     Current packs/day: 0.50     Average packs/day: 0.5 packs/day for 30.0 years (15.0 ttl pk-yrs)     Types: Cigarettes    Smokeless tobacco: Never   Substance and Sexual Activity    Alcohol use: Never    Drug use: Never    Sexual activity: Not Currently     Partners: Male     Immunization History   Administered Date(s) Administered    Flu vaccine (IIV4), preservative free *Check age/dose* 11/09/2011, 10/12/2015, 10/25/2016, 10/30/2017, 10/11/2018, 10/01/2020, 10/06/2021, 10/07/2022, 10/23/2023    Flu vaccine, quadrivalent, high-dose, preservative free, age 65y+ (FLUZONE) 10/23/2023    Flu vaccine, quadrivalent, no egg protein, age 6 month or greater (FLUCELVAX) 10/30/2017    Flu vaccine, trivalent, preservative free, HIGH-DOSE, age 65y+ (Fluzone) 10/25/2016, 10/11/2018    Flu vaccine, trivalent, preservative free, age 6 months and greater (Fluarix/Fluzone/Flulaval) 10/12/2015    Influenza, Seasonal, Quadrivalent, Adjuvanted 10/01/2020, 10/06/2021, 10/07/2022    Influenza, seasonal, injectable 11/09/2011    Moderna COVID-19 vaccine, 12 years and older (50mcg/0.5mL)(Spikevax) 10/09/2023    Moderna COVID-19 vaccine, bivalent, blue cap/gray label *Check age/dose* 11/21/2022    Moderna SARS-CoV-2 Vaccination 01/07/2021, 02/03/2021, 10/29/2021, 06/10/2022    Pneumococcal conjugate vaccine, 13-valent (PREVNAR 13) 10/12/2015    Pneumococcal conjugate vaccine, 20-valent (PREVNAR 20) 06/30/2023    Pneumococcal polysaccharide vaccine, 23-valent, age 2 years and older (PNEUMOVAX 23) 11/02/2020    RESPIRATORY SYNCYTIAL VIRUS (RSV), ELIGIBLE PREGNANT PTS, 0.5 ML (ABRYSVO) 12/20/2023    Tdap vaccine, age 7 year and older (BOOSTRIX, ADACEL) 06/30/2023    Zoster vaccine, recombinant, adult (SHINGRIX) 09/24/2019, 09/27/2019, 12/03/2019     "Zoster, Unspecified 09/27/2019, 12/03/2019    Zoster, live 01/10/2017       Review of Systems  Review of systems is otherwise negative unless stated above or in history of present illness.    Objective   Visit Vitals  /62   Pulse 63   Temp 35.8 °C (96.5 °F)   Ht 1.676 m (5' 6\")   Wt 77.3 kg (170 lb 6.4 oz)   SpO2 96%   BMI 27.50 kg/m²   Smoking Status Former   BSA 1.9 m²     Physical Exam  Constitutional: Anxiety     General: not in acute distress.   HENT: Allergic rhinitis     Head: Normocephalic and atraumatic.      Nose: Nose normal.   Eyes: Allergic conjunctivitis     Extraocular Movements: Extraocular movements intact.      Conjunctiva/sclera: Conjunctivae normal.   Cardiovascular:      Rate and Rhythm: Normal rate ,  No M/R/G  Pulmonary: Crackles rhonchi     Effort: Pulmonary effort is normal.      Breath sounds: Normal, Bilat Equal AE  Skin: Multiple skin lesion     General: Skin is warm.   Neurological:      Mental Status: He is alert and oriented to person, place, and time.   Psychiatric:    Moderate anxiety     Mood and Affect: Mood normal.         Behavior: Behavior normal.   Musculoskeletal osteopenia  FROM in all extremitirs,  Joint-no swelling or tenderness    No visits with results within 4 Month(s) from this visit.   Latest known visit with results is:   Office Visit on 12/30/2023   Component Date Value Ref Range Status    POC Color, Urine 12/30/2023 Yellow  Straw, Yellow, Light-Yellow Final    POC Appearance, Urine 12/30/2023 Cloudy (A)  Clear Final    POC Glucose, Urine 12/30/2023 NEGATIVE  NEGATIVE mg/dl Final    POC Bilirubin, Urine 12/30/2023 NEGATIVE  NEGATIVE Final    POC Ketones, Urine 12/30/2023 NEGATIVE  NEGATIVE mg/dl Final    POC Specific Gravity, Urine 12/30/2023 1.020  1.005 - 1.035 Final    POC Blood, Urine 12/30/2023 MODERATE (2+) (A)  NEGATIVE Final    POC PH, Urine 12/30/2023 6.0  No Reference Range Established PH Final    POC Protein, Urine 12/30/2023 30 (1+)  NEGATIVE, 30 " (1+) mg/dl Final    POC Urobilinogen, Urine 12/30/2023 0.2  0.2, 1.0 EU/DL Final    Poc Nitrite, Urine 12/30/2023 NEGATIVE  NEGATIVE Final    POC Leukocytes, Urine 12/30/2023 MODERATE (2+) (A)  NEGATIVE Final    Urine Culture 12/30/2023 20,000 - 80,000 Escherichia coli (A)   Final       Radiology: Reviewed imaging in powerchart.  No results found.      Charting was completed using voice recognition technology and may include unintended errors.

## 2024-11-22 NOTE — ASSESSMENT & PLAN NOTE
Continue Cozaar 50 mg a day monitor BMP twice a year continue amlodipine 2.5 mg a day monitor edema

## 2025-03-21 ENCOUNTER — APPOINTMENT (OUTPATIENT)
Dept: PRIMARY CARE | Facility: CLINIC | Age: 85
End: 2025-03-21
Payer: MEDICARE